# Patient Record
Sex: FEMALE | Race: WHITE | NOT HISPANIC OR LATINO | Employment: UNEMPLOYED | URBAN - METROPOLITAN AREA
[De-identification: names, ages, dates, MRNs, and addresses within clinical notes are randomized per-mention and may not be internally consistent; named-entity substitution may affect disease eponyms.]

---

## 2023-03-02 LAB
FOLLITROPIN (IU/L) IN SER/PLAS: 57.5 IU/L
LUTEINIZING HORMONE (IU/ML) IN SER/PLAS: 49 IU/L

## 2023-03-27 ENCOUNTER — OFFICE VISIT (OUTPATIENT)
Dept: PRIMARY CARE | Facility: CLINIC | Age: 48
End: 2023-03-27
Payer: COMMERCIAL

## 2023-03-27 VITALS
SYSTOLIC BLOOD PRESSURE: 180 MMHG | TEMPERATURE: 97.8 F | OXYGEN SATURATION: 97 % | BODY MASS INDEX: 20.49 KG/M2 | WEIGHT: 123 LBS | HEIGHT: 65 IN | RESPIRATION RATE: 20 BRPM | DIASTOLIC BLOOD PRESSURE: 93 MMHG | HEART RATE: 120 BPM

## 2023-03-27 DIAGNOSIS — D25.9 UTERINE LEIOMYOMA, UNSPECIFIED LOCATION: ICD-10-CM

## 2023-03-27 DIAGNOSIS — R20.2 NUMBNESS AND TINGLING IN BOTH HANDS: ICD-10-CM

## 2023-03-27 DIAGNOSIS — K76.0 HEPATIC STEATOSIS: ICD-10-CM

## 2023-03-27 DIAGNOSIS — R11.15 CYCLICAL VOMITING SYNDROME NOT ASSOCIATED WITH MIGRAINE: ICD-10-CM

## 2023-03-27 DIAGNOSIS — F10.220 ALCOHOL DEPENDENCE WITH UNCOMPLICATED INTOXICATION (MULTI): ICD-10-CM

## 2023-03-27 DIAGNOSIS — E44.0 MODERATE PROTEIN-CALORIE MALNUTRITION (MULTI): ICD-10-CM

## 2023-03-27 DIAGNOSIS — R10.84 GENERALIZED ABDOMINAL PAIN: ICD-10-CM

## 2023-03-27 DIAGNOSIS — F12.20 MARIJUANA DEPENDENCE (MULTI): ICD-10-CM

## 2023-03-27 DIAGNOSIS — R20.0 NUMBNESS AND TINGLING IN BOTH HANDS: ICD-10-CM

## 2023-03-27 DIAGNOSIS — Z72.0 TOBACCO USE: Primary | ICD-10-CM

## 2023-03-27 DIAGNOSIS — R63.4 WEIGHT LOSS, UNINTENTIONAL: ICD-10-CM

## 2023-03-27 DIAGNOSIS — R27.0 ATAXIA: ICD-10-CM

## 2023-03-27 PROBLEM — F10.20 ALCOHOL DEPENDENCE (MULTI): Status: ACTIVE | Noted: 2023-03-27

## 2023-03-27 PROBLEM — R11.10 VOMITING: Status: ACTIVE | Noted: 2023-03-27

## 2023-03-27 PROCEDURE — 99204 OFFICE O/P NEW MOD 45 MIN: CPT | Performed by: INTERNAL MEDICINE

## 2023-03-27 RX ORDER — OMEPRAZOLE 40 MG/1
40 CAPSULE, DELAYED RELEASE ORAL
COMMUNITY
Start: 2023-02-28 | End: 2023-12-13 | Stop reason: ALTCHOICE

## 2023-03-27 RX ORDER — LANOLIN ALCOHOL/MO/W.PET/CERES
1000 CREAM (GRAM) TOPICAL DAILY
Qty: 30 TABLET | Refills: 3 | Status: SHIPPED | OUTPATIENT
Start: 2023-03-27 | End: 2023-08-08

## 2023-03-27 RX ORDER — ONDANSETRON 8 MG/1
8 TABLET, ORALLY DISINTEGRATING ORAL EVERY 8 HOURS PRN
COMMUNITY
Start: 2023-03-01 | End: 2024-03-13 | Stop reason: SDUPTHER

## 2023-03-27 RX ORDER — LANOLIN ALCOHOL/MO/W.PET/CERES
100 CREAM (GRAM) TOPICAL DAILY
Qty: 30 TABLET | Refills: 11 | Status: SHIPPED | OUTPATIENT
Start: 2023-03-27 | End: 2024-04-15

## 2023-03-27 RX ORDER — FOLIC ACID 1 MG/1
1 TABLET ORAL DAILY
Qty: 30 TABLET | Refills: 11 | Status: SHIPPED | OUTPATIENT
Start: 2023-03-27 | End: 2024-04-15

## 2023-03-27 RX ORDER — PYRIDOXINE HCL (VITAMIN B6) 25 MG
25 TABLET ORAL ONCE
Status: DISCONTINUED | OUTPATIENT
Start: 2023-03-27 | End: 2024-06-07

## 2023-03-27 SDOH — ECONOMIC STABILITY: FOOD INSECURITY: WITHIN THE PAST 12 MONTHS, YOU WORRIED THAT YOUR FOOD WOULD RUN OUT BEFORE YOU GOT MONEY TO BUY MORE.: NEVER TRUE

## 2023-03-27 SDOH — ECONOMIC STABILITY: FOOD INSECURITY: WITHIN THE PAST 12 MONTHS, THE FOOD YOU BOUGHT JUST DIDN'T LAST AND YOU DIDN'T HAVE MONEY TO GET MORE.: NEVER TRUE

## 2023-03-27 ASSESSMENT — ENCOUNTER SYMPTOMS
WHEEZING: 0
RHINORRHEA: 0
FREQUENCY: 0
CHEST TIGHTNESS: 0
DIFFICULTY URINATING: 0
SHORTNESS OF BREATH: 0
ABDOMINAL PAIN: 1
WEAKNESS: 1
SORE THROAT: 0
POLYDIPSIA: 0
DIZZINESS: 1
CHILLS: 1
FATIGUE: 1
FLANK PAIN: 0
UNEXPECTED WEIGHT CHANGE: 1
DYSURIA: 0
ABDOMINAL DISTENTION: 0
OCCASIONAL FEELINGS OF UNSTEADINESS: 1
PALPITATIONS: 0
SEIZURES: 0
FEVER: 1
NECK STIFFNESS: 1
LOSS OF SENSATION IN FEET: 1
DEPRESSION: 0
NAUSEA: 1
MYALGIAS: 1
PSYCHIATRIC NEGATIVE: 1
NUMBNESS: 1
HEADACHES: 1
TREMORS: 1
DIARRHEA: 1
LIGHT-HEADEDNESS: 1
VOMITING: 1
BLOOD IN STOOL: 0
CONSTIPATION: 1

## 2023-03-27 ASSESSMENT — PATIENT HEALTH QUESTIONNAIRE - PHQ9
SUM OF ALL RESPONSES TO PHQ9 QUESTIONS 1 & 2: 0
1. LITTLE INTEREST OR PLEASURE IN DOING THINGS: NOT AT ALL
2. FEELING DOWN, DEPRESSED OR HOPELESS: NOT AT ALL

## 2023-03-27 ASSESSMENT — LIFESTYLE VARIABLES
AUDIT-C TOTAL SCORE: 4
HOW MANY STANDARD DRINKS CONTAINING ALCOHOL DO YOU HAVE ON A TYPICAL DAY: 1 OR 2
HOW OFTEN DO YOU HAVE A DRINK CONTAINING ALCOHOL: 4 OR MORE TIMES A WEEK
HOW OFTEN DO YOU HAVE SIX OR MORE DRINKS ON ONE OCCASION: NEVER
SKIP TO QUESTIONS 9-10: 1

## 2023-03-27 NOTE — ASSESSMENT & PLAN NOTE
Pt asleep on stretcher. Daughter at bedside. No distress observed at this time. Will continue to monitor. This is concerning for acute on chronic liver disease, likely due to long standing  alcohol abuse. Check vitamin B levels including B 12, B 6, and thiamine, will start B supplement. Check an US of the liver and spleen now. Check acute hepatitis studies, HIV

## 2023-03-27 NOTE — PROGRESS NOTES
"Subjective   Patient ID: Maru Huynh is a 47 y.o. female who presents for New Patient Visit, body pain (Severe pain X several months.), mobility issues, hand numbness, and Nausea.    HPI   Patient is here to establish primary care. Has not seen a primary care physician for the past 5-6  years. Does not have any diagnosed medical illness. Is not up to date on adult vaccines but was fully vaccinated as a child.   Does not take medications or vitamins.    States 4 weeks ago, woke up with numbness and tingling in hands bilaterally and lower extremity pain. Went to [facility] emergency department and discharged without alleviation of symptoms. Uterine fibroid was noted on ultrasound and patient followed-up with OB/GYN, which was evaluated as benign. Mentions menopausal with last menstrual period two years ago.    Over the last few weeks, pain has increased in intensity, is finding ambulation to be difficult with intermittent episodes of losing balance, sharp \"stabbing\" pain lasting 10 seconds, and episodes of inability to use her hands with tremulous activity. Has had nonbilious/nonbloody emesis intermittently over last week. Has vomited about 5 times today with subsequent 15 pound weight loss in the last 2 weeks. She has had some diarrhea, omeprazole caused some constipation    Family history of cardiac disease and cancer in maternal and paternal side.    Lives at home with significant other. Feels safe at home.  Tobacco 1 ppd for last 20 years. Smokes marijuana daily.  Drinks 1 pint liquor daily. Gets drunk to help sleep at night.  Has had alcohol withdrawal but never hospitalized. Admits to undiagnosed seizures but does not think it was due to alcohol.    Review of Systems   Constitutional:  Positive for chills, fatigue, fever and unexpected weight change (15 pound loss).   HENT:  Negative for hearing loss, nosebleeds, postnasal drip, rhinorrhea, sneezing and sore throat.    Respiratory:  Negative for chest tightness, " "shortness of breath and wheezing.    Cardiovascular:  Positive for chest pain (intermittent with vomiting). Negative for palpitations and leg swelling.   Gastrointestinal:  Positive for abdominal pain, constipation, diarrhea, nausea and vomiting. Negative for abdominal distention and blood in stool.   Endocrine: Negative for heat intolerance, polydipsia and polyuria.   Genitourinary:  Negative for difficulty urinating, dysuria, flank pain, frequency and urgency.   Musculoskeletal:  Positive for gait problem, myalgias and neck stiffness.   Skin: Negative.    Neurological:  Positive for dizziness, tremors, weakness, light-headedness, numbness and headaches. Negative for seizures and syncope.   Psychiatric/Behavioral: Negative.         Objective   BP (!) 180/93 (BP Location: Left arm, Patient Position: Sitting, BP Cuff Size: Adult)   Pulse (!) 120   Temp 36.6 °C (97.8 °F)   Resp 20   Ht 1.638 m (5' 4.5\")   Wt 55.8 kg (123 lb)   SpO2 97%   BMI 20.79 kg/m²     Physical Exam  Constitutional:       Appearance: She is ill-appearing. She is not diaphoretic.      Comments: Looks malnourished.    HENT:      Head: Normocephalic and atraumatic.      Comments: She is edentulous     Nose: Nose normal.   Eyes:      General: Scleral icterus present.      Extraocular Movements: Extraocular movements intact.   Cardiovascular:      Rate and Rhythm: Regular rhythm. Tachycardia present.      Pulses: Normal pulses.      Heart sounds: Normal heart sounds.   Pulmonary:      Effort: Pulmonary effort is normal. No respiratory distress.      Breath sounds: Normal breath sounds. No wheezing.   Chest:      Chest wall: Tenderness (Tender to palpation) present.   Abdominal:      General: Abdomen is flat. Bowel sounds are normal. There is no distension.      Palpations: Abdomen is soft.      Tenderness: There is abdominal tenderness (Right upper and Left upper quadrant). There is guarding (Right upper and Left upper quadrant). There is no " rebound.      Comments: Hepatomegaly is noted , palpable about 3-4 cm below RCM   Musculoskeletal:         General: Swelling and tenderness present.      Cervical back: Normal range of motion and neck supple.      Right lower leg: No edema.      Left lower leg: No edema.   Skin:     General: Skin is dry.      Coloration: Skin is pale.      Findings: No erythema or lesion.      Comments: No scleral icterus is noted, skin is mildly jaundiced   Neurological:      Mental Status: She is alert and oriented to person, place, and time.      Sensory: No sensory deficit.      Motor: Weakness present.      Coordination: Coordination abnormal.      Gait: Gait abnormal (Ataxic and tremulous).      Deep Tendon Reflexes: Reflexes normal.      Comments: Tremor of the hands is noted, finger to nose testing does cause an increase in symptoms of tremor   Psychiatric:         Mood and Affect: Mood normal.         Behavior: Behavior normal.         Thought Content: Thought content normal.         Assessment/Plan   Problem List Items Addressed This Visit          Nervous    Generalized abdominal pain: This is concerning for acute on chronic liver disease, likely due to long standing alcohol abuse. Check vitamin B levels, including B12, B6, thiamine , will start B supplements , check hepatitis studies and HIV also      Numbness and tingling in both hands: see above       Digestive    Hepatic steatosis     Labs from 2/28 emergency department visit reveal , ALT 39.             Genitourinary    Uterine fibroid     Continue to follow-up with OB/GYN.            Endocrine/Metabolic    Weight loss, unintentional    Moderate protein-calorie malnutrition (CMS/HCC), check B levels       Other    Tobacco use - Primary     Smokes 1 ppd. Declining cessation at this time.         Alcohol dependence (CMS/HCC)     Will check B12 levels. Will start on thiamine and folic acid supplementation.         Marijuana dependence (CMS/HCC)    Ataxia   Acute  initial visit: patient may need to go through detox     Will refer to hepatobiliary specialist if needed , check US. If the symptoms worsen, go to ER for admission for treatment of alcohol withdrawal    Continue to follow-up with OB/GYN.  Follow-up here after lab results. Go to ER if symptoms progress. She will require detox

## 2023-04-04 ENCOUNTER — TELEPHONE (OUTPATIENT)
Dept: PRIMARY CARE | Facility: CLINIC | Age: 48
End: 2023-04-04
Payer: COMMERCIAL

## 2023-04-04 NOTE — TELEPHONE ENCOUNTER
Pt was discharged Sunday after going to the hospital during LOV and is supposed to follow up with you within a week but the soonest apt for any provider here is 3/27. What do you want to do?

## 2023-04-12 PROBLEM — F10.939 ALCOHOL WITHDRAWAL SYNDROME (MULTI): Status: ACTIVE | Noted: 2023-04-12

## 2023-04-12 PROBLEM — E87.29 ALCOHOLIC KETOACIDOSIS: Status: ACTIVE | Noted: 2023-04-12

## 2023-04-14 ENCOUNTER — LAB (OUTPATIENT)
Dept: LAB | Facility: LAB | Age: 48
End: 2023-04-14
Payer: COMMERCIAL

## 2023-04-14 ENCOUNTER — OFFICE VISIT (OUTPATIENT)
Dept: PRIMARY CARE | Facility: CLINIC | Age: 48
End: 2023-04-14
Payer: COMMERCIAL

## 2023-04-14 VITALS
WEIGHT: 123 LBS | BODY MASS INDEX: 20.49 KG/M2 | HEART RATE: 109 BPM | SYSTOLIC BLOOD PRESSURE: 120 MMHG | OXYGEN SATURATION: 96 % | DIASTOLIC BLOOD PRESSURE: 79 MMHG | RESPIRATION RATE: 16 BRPM | HEIGHT: 65 IN

## 2023-04-14 DIAGNOSIS — F12.20 MARIJUANA DEPENDENCE (MULTI): ICD-10-CM

## 2023-04-14 DIAGNOSIS — F10.220 ALCOHOL DEPENDENCE WITH UNCOMPLICATED INTOXICATION (MULTI): ICD-10-CM

## 2023-04-14 DIAGNOSIS — R20.0 NUMBNESS AND TINGLING IN BOTH HANDS: ICD-10-CM

## 2023-04-14 DIAGNOSIS — F32.A DEPRESSIVE DISORDER: ICD-10-CM

## 2023-04-14 DIAGNOSIS — E44.0 MODERATE PROTEIN-CALORIE MALNUTRITION (MULTI): ICD-10-CM

## 2023-04-14 DIAGNOSIS — R20.2 NUMBNESS AND TINGLING IN BOTH HANDS: ICD-10-CM

## 2023-04-14 DIAGNOSIS — F10.930 ALCOHOL WITHDRAWAL SYNDROME WITHOUT COMPLICATION (MULTI): ICD-10-CM

## 2023-04-14 DIAGNOSIS — Z72.0 TOBACCO USE: ICD-10-CM

## 2023-04-14 DIAGNOSIS — R11.15 CYCLICAL VOMITING SYNDROME NOT ASSOCIATED WITH MIGRAINE: ICD-10-CM

## 2023-04-14 DIAGNOSIS — D25.9 UTERINE LEIOMYOMA, UNSPECIFIED LOCATION: ICD-10-CM

## 2023-04-14 DIAGNOSIS — R63.4 WEIGHT LOSS, UNINTENTIONAL: ICD-10-CM

## 2023-04-14 DIAGNOSIS — K76.0 HEPATIC STEATOSIS: Primary | ICD-10-CM

## 2023-04-14 DIAGNOSIS — R27.0 ATAXIA: ICD-10-CM

## 2023-04-14 DIAGNOSIS — R10.84 GENERALIZED ABDOMINAL PAIN: ICD-10-CM

## 2023-04-14 DIAGNOSIS — K76.0 HEPATIC STEATOSIS: ICD-10-CM

## 2023-04-14 LAB
ALANINE AMINOTRANSFERASE (SGPT) (U/L) IN SER/PLAS: 30 U/L (ref 7–45)
ALBUMIN (G/DL) IN SER/PLAS: 4.6 G/DL (ref 3.4–5)
ALKALINE PHOSPHATASE (U/L) IN SER/PLAS: 104 U/L (ref 33–110)
ANION GAP IN SER/PLAS: 13 MMOL/L (ref 10–20)
ASPARTATE AMINOTRANSFERASE (SGOT) (U/L) IN SER/PLAS: 44 U/L (ref 9–39)
BASOPHILS (10*3/UL) IN BLOOD BY AUTOMATED COUNT: 0.12 X10E9/L (ref 0–0.1)
BASOPHILS/100 LEUKOCYTES IN BLOOD BY AUTOMATED COUNT: 1.7 % (ref 0–2)
BILIRUBIN DIRECT (MG/DL) IN SER/PLAS: 0.1 MG/DL (ref 0–0.3)
BILIRUBIN TOTAL (MG/DL) IN SER/PLAS: 0.5 MG/DL (ref 0–1.2)
CALCIUM (MG/DL) IN SER/PLAS: 10.1 MG/DL (ref 8.6–10.3)
CARBON DIOXIDE, TOTAL (MMOL/L) IN SER/PLAS: 25 MMOL/L (ref 21–32)
CHLORIDE (MMOL/L) IN SER/PLAS: 103 MMOL/L (ref 98–107)
COBALAMIN (VITAMIN B12) (PG/ML) IN SER/PLAS: 875 PG/ML (ref 211–911)
CREATININE (MG/DL) IN SER/PLAS: 0.59 MG/DL (ref 0.5–1.05)
EOSINOPHILS (10*3/UL) IN BLOOD BY AUTOMATED COUNT: 0.08 X10E9/L (ref 0–0.7)
EOSINOPHILS/100 LEUKOCYTES IN BLOOD BY AUTOMATED COUNT: 1.1 % (ref 0–6)
ERYTHROCYTE DISTRIBUTION WIDTH (RATIO) BY AUTOMATED COUNT: 13.6 % (ref 11.5–14.5)
ERYTHROCYTE MEAN CORPUSCULAR HEMOGLOBIN CONCENTRATION (G/DL) BY AUTOMATED: 32.6 G/DL (ref 32–36)
ERYTHROCYTE MEAN CORPUSCULAR VOLUME (FL) BY AUTOMATED COUNT: 98 FL (ref 80–100)
ERYTHROCYTES (10*6/UL) IN BLOOD BY AUTOMATED COUNT: 4.71 X10E12/L (ref 4–5.2)
FOLATE (NG/ML) IN SER/PLAS: >22.3 NG/ML
GFR FEMALE: >90 ML/MIN/1.73M2
GLUCOSE (MG/DL) IN SER/PLAS: 101 MG/DL (ref 74–99)
HEMATOCRIT (%) IN BLOOD BY AUTOMATED COUNT: 46 % (ref 36–46)
HEMOGLOBIN (G/DL) IN BLOOD: 15 G/DL (ref 12–16)
IMMATURE GRANULOCYTES/100 LEUKOCYTES IN BLOOD BY AUTOMATED COUNT: 0.4 % (ref 0–0.9)
LEUKOCYTES (10*3/UL) IN BLOOD BY AUTOMATED COUNT: 7.1 X10E9/L (ref 4.4–11.3)
LYMPHOCYTES (10*3/UL) IN BLOOD BY AUTOMATED COUNT: 1.69 X10E9/L (ref 1.2–4.8)
LYMPHOCYTES/100 LEUKOCYTES IN BLOOD BY AUTOMATED COUNT: 24 % (ref 13–44)
MAGNESIUM (MG/DL) IN SER/PLAS: 1.91 MG/DL (ref 1.6–2.4)
MONOCYTES (10*3/UL) IN BLOOD BY AUTOMATED COUNT: 0.52 X10E9/L (ref 0.1–1)
MONOCYTES/100 LEUKOCYTES IN BLOOD BY AUTOMATED COUNT: 7.4 % (ref 2–10)
NEUTROPHILS (10*3/UL) IN BLOOD BY AUTOMATED COUNT: 4.61 X10E9/L (ref 1.2–7.7)
NEUTROPHILS/100 LEUKOCYTES IN BLOOD BY AUTOMATED COUNT: 65.4 % (ref 40–80)
PLATELETS (10*3/UL) IN BLOOD AUTOMATED COUNT: 260 X10E9/L (ref 150–450)
POTASSIUM (MMOL/L) IN SER/PLAS: 3.7 MMOL/L (ref 3.5–5.3)
PROTEIN TOTAL: 7.2 G/DL (ref 6.4–8.2)
SODIUM (MMOL/L) IN SER/PLAS: 137 MMOL/L (ref 136–145)
THYROTROPIN (MIU/L) IN SER/PLAS BY DETECTION LIMIT <= 0.05 MIU/L: 2.46 MIU/L (ref 0.44–3.98)
UREA NITROGEN (MG/DL) IN SER/PLAS: 10 MG/DL (ref 6–23)

## 2023-04-14 PROCEDURE — 80076 HEPATIC FUNCTION PANEL: CPT

## 2023-04-14 PROCEDURE — 85025 COMPLETE CBC W/AUTO DIFF WBC: CPT

## 2023-04-14 PROCEDURE — 82607 VITAMIN B-12: CPT

## 2023-04-14 PROCEDURE — 87389 HIV-1 AG W/HIV-1&-2 AB AG IA: CPT

## 2023-04-14 PROCEDURE — 80048 BASIC METABOLIC PNL TOTAL CA: CPT

## 2023-04-14 PROCEDURE — 99214 OFFICE O/P EST MOD 30 MIN: CPT | Performed by: INTERNAL MEDICINE

## 2023-04-14 PROCEDURE — 84443 ASSAY THYROID STIM HORMONE: CPT

## 2023-04-14 PROCEDURE — 80074 ACUTE HEPATITIS PANEL: CPT

## 2023-04-14 PROCEDURE — 83735 ASSAY OF MAGNESIUM: CPT

## 2023-04-14 PROCEDURE — 82746 ASSAY OF FOLIC ACID SERUM: CPT

## 2023-04-14 PROCEDURE — 84425 ASSAY OF VITAMIN B-1: CPT

## 2023-04-14 PROCEDURE — 36415 COLL VENOUS BLD VENIPUNCTURE: CPT

## 2023-04-14 RX ORDER — CLONIDINE HYDROCHLORIDE 0.1 MG/1
TABLET ORAL
COMMUNITY
Start: 2023-04-01 | End: 2023-04-26 | Stop reason: SDUPTHER

## 2023-04-14 RX ORDER — IBUPROFEN 200 MG
1 TABLET ORAL EVERY 24 HOURS
Qty: 30 PATCH | Refills: 1 | Status: SHIPPED | OUTPATIENT
Start: 2023-04-14 | End: 2023-06-27

## 2023-04-14 RX ORDER — DULOXETIN HYDROCHLORIDE 30 MG/1
30 CAPSULE, DELAYED RELEASE ORAL DAILY
Qty: 60 CAPSULE | Refills: 3 | Status: SHIPPED | OUTPATIENT
Start: 2023-04-14 | End: 2023-09-28 | Stop reason: SDUPTHER

## 2023-04-14 RX ORDER — AMLODIPINE BESYLATE 10 MG/1
TABLET ORAL
COMMUNITY
Start: 2023-04-01 | End: 2023-04-26 | Stop reason: SDUPTHER

## 2023-04-14 ASSESSMENT — PATIENT HEALTH QUESTIONNAIRE - PHQ9
6. FEELING BAD ABOUT YOURSELF - OR THAT YOU ARE A FAILURE OR HAVE LET YOURSELF OR YOUR FAMILY DOWN: SEVERAL DAYS
5. POOR APPETITE OR OVEREATING: NOT AT ALL
10. IF YOU CHECKED OFF ANY PROBLEMS, HOW DIFFICULT HAVE THESE PROBLEMS MADE IT FOR YOU TO DO YOUR WORK, TAKE CARE OF THINGS AT HOME, OR GET ALONG WITH OTHER PEOPLE: EXTREMELY DIFFICULT
1. LITTLE INTEREST OR PLEASURE IN DOING THINGS: NEARLY EVERY DAY
3. TROUBLE FALLING OR STAYING ASLEEP OR SLEEPING TOO MUCH: NEARLY EVERY DAY
7. TROUBLE CONCENTRATING ON THINGS, SUCH AS READING THE NEWSPAPER OR WATCHING TELEVISION: NEARLY EVERY DAY
8. MOVING OR SPEAKING SO SLOWLY THAT OTHER PEOPLE COULD HAVE NOTICED. OR THE OPPOSITE, BEING SO FIGETY OR RESTLESS THAT YOU HAVE BEEN MOVING AROUND A LOT MORE THAN USUAL: NOT AT ALL
SUM OF ALL RESPONSES TO PHQ9 QUESTIONS 1 AND 2: 6
2. FEELING DOWN, DEPRESSED OR HOPELESS: NEARLY EVERY DAY
SUM OF ALL RESPONSES TO PHQ QUESTIONS 1-9: 18
4. FEELING TIRED OR HAVING LITTLE ENERGY: NEARLY EVERY DAY
9. THOUGHTS THAT YOU WOULD BE BETTER OFF DEAD, OR OF HURTING YOURSELF: MORE THAN HALF THE DAYS

## 2023-04-14 NOTE — ASSESSMENT & PLAN NOTE
B12, folate levels are in normal range, patient is taking both vitamins now, she has quit alcohol use which is likely the best therapy. Will check NCT/ EMG of the upper extremities. Check a TSH, recheck liver enzymes now. Trial of duloxetine 30 mg daily for 1 week then increase to 60 mg daily, recheck the patient in about 4 weeks

## 2023-04-15 LAB
HEPATITIS A VIRUS IGM AB PRESENCE IN SER/PLAS BY IMMUNOASSAY: NONREACTIVE
HEPATITIS B VIRUS CORE IGM AB PRESENCE IN SER/PLAS BY IMMUNOASSY: NONREACTIVE
HEPATITIS B VIRUS SURFACE AG PRESENCE IN SERUM: NONREACTIVE
HEPATITIS C VIRUS AB PRESENCE IN SERUM: NONREACTIVE
HIV 1/ 2 AG/AB SCREEN: NONREACTIVE

## 2023-04-20 LAB — VITAMIN B1, WHOLE BLOOD: 213 NMOL/L (ref 70–180)

## 2023-04-26 DIAGNOSIS — I10 PRIMARY HYPERTENSION: Primary | ICD-10-CM

## 2023-04-26 RX ORDER — CLONIDINE HYDROCHLORIDE 0.1 MG/1
0.1 TABLET ORAL DAILY
Qty: 90 TABLET | Refills: 2 | Status: SHIPPED | OUTPATIENT
Start: 2023-04-26 | End: 2023-05-09 | Stop reason: SDUPTHER

## 2023-04-26 RX ORDER — AMLODIPINE BESYLATE 10 MG/1
TABLET ORAL
Qty: 90 TABLET | Refills: 2 | Status: SHIPPED | OUTPATIENT
Start: 2023-04-26 | End: 2023-11-22

## 2023-04-26 NOTE — TELEPHONE ENCOUNTER
Pt called to request refills on the 2 medications that she was prescribed at the hospital after surgery...amlodipine 10 mg 1 daily and clonidine 0.1 mg 2 x daily sent to [facility] in [place].    Also had questions about the Cymbalta, she is having very bad body aches and very tired. She wanted to know how long that will last, if its a side effect, and if it will get better.

## 2023-05-02 ENCOUNTER — TELEPHONE (OUTPATIENT)
Dept: PRIMARY CARE | Facility: CLINIC | Age: 48
End: 2023-05-02
Payer: COMMERCIAL

## 2023-05-02 DIAGNOSIS — I10 PRIMARY HYPERTENSION: ICD-10-CM

## 2023-05-02 NOTE — TELEPHONE ENCOUNTER
Patient called re: her clonidine rx    She was prescribed this when she left the hospital.  She was taking 2 a day.  The refill that Dr NICHOLS called in directs her to take one a day.  She wants to confirm the dosage before her next appointment.

## 2023-05-09 RX ORDER — CLONIDINE HYDROCHLORIDE 0.1 MG/1
0.1 TABLET ORAL 2 TIMES DAILY
Qty: 180 TABLET | Refills: 2 | Status: SHIPPED | OUTPATIENT
Start: 2023-05-09 | End: 2024-02-19

## 2023-05-12 ENCOUNTER — OFFICE VISIT (OUTPATIENT)
Dept: PRIMARY CARE | Facility: CLINIC | Age: 48
End: 2023-05-12
Payer: COMMERCIAL

## 2023-05-12 VITALS
HEIGHT: 65 IN | SYSTOLIC BLOOD PRESSURE: 112 MMHG | BODY MASS INDEX: 18.83 KG/M2 | TEMPERATURE: 97 F | OXYGEN SATURATION: 97 % | WEIGHT: 113 LBS | RESPIRATION RATE: 18 BRPM | HEART RATE: 92 BPM | DIASTOLIC BLOOD PRESSURE: 77 MMHG

## 2023-05-12 DIAGNOSIS — F10.220 ALCOHOL DEPENDENCE WITH UNCOMPLICATED INTOXICATION (MULTI): Primary | ICD-10-CM

## 2023-05-12 DIAGNOSIS — M79.2 PERIPHERAL NEUROPATHIC PAIN: ICD-10-CM

## 2023-05-12 DIAGNOSIS — Z72.0 TOBACCO USE: ICD-10-CM

## 2023-05-12 DIAGNOSIS — R20.0 NUMBNESS AND TINGLING IN BOTH HANDS: ICD-10-CM

## 2023-05-12 DIAGNOSIS — R20.2 NUMBNESS AND TINGLING IN BOTH HANDS: ICD-10-CM

## 2023-05-12 PROBLEM — T78.40XA ALLERGY: Status: ACTIVE | Noted: 2017-11-22

## 2023-05-12 PROCEDURE — 99214 OFFICE O/P EST MOD 30 MIN: CPT | Performed by: INTERNAL MEDICINE

## 2023-05-12 RX ORDER — GABAPENTIN 100 MG/1
100 CAPSULE ORAL 3 TIMES DAILY
Qty: 90 CAPSULE | Refills: 3 | Status: SHIPPED | OUTPATIENT
Start: 2023-05-12 | End: 2023-07-14 | Stop reason: SDUPTHER

## 2023-05-12 ASSESSMENT — ENCOUNTER SYMPTOMS
OCCASIONAL FEELINGS OF UNSTEADINESS: 1
DEPRESSION: 0
LOSS OF SENSATION IN FEET: 1

## 2023-05-12 NOTE — PROGRESS NOTES
Chief Complaint/HPI:    Patient was hospitalized after beginning to experience  symptoms of acute alcohol withdrawal, she was hospitalized for treatment, she was discharged to follow up at [place] [address] for intensive outpatient therapy, the patient still has pain and numbness in the hands worse than the feet. The neuropathy seems to be diffuse now. Patient has been getting stabbing pain in the hands and feet. She is taking Cymbalta. NCTs/ EMGs have not been able to be scheduled. Patient is upset that these could not be scheduled as ordered. Patient is eating, she has lost quite a bit of weight.   Patient is still smoking, she tried a nicotine patch, it has not been effective.  Patient smokes about 1 1/2 ppd. She is not able to walk due to pain issues. She is now also taking B12, folate, and thiamine. She has not had any alcohol for 7 weeks.     ROS otherwise negative aside from what was mentioned above in HPI.      Patient Active Problem List   Diagnosis    Tobacco use    Alcohol dependence (CMS/HCC)    Marijuana dependence (CMS/HCC)    Generalized abdominal pain    Numbness and tingling in both hands    Ataxia    Uterine fibroid    Weight loss, unintentional    Hepatic steatosis    Moderate protein-calorie malnutrition (CMS/HCC)    Vomiting    Alcohol withdrawal syndrome (CMS/HCC)    Alcoholic ketoacidosis    Allergy         No past medical history on file.  Past Surgical History:   Procedure Laterality Date    TONSILLECTOMY Bilateral     TUBAL LIGATION       Social History     Social History Narrative    Not on file         ALLERGIES  Penicillins      MEDICATIONS  Current Outpatient Medications on File Prior to Visit   Medication Sig Dispense Refill    amLODIPine (Norvasc) 10 mg tablet Take one daily 90 tablet 2    cloNIDine (Catapres) 0.1 mg tablet Take 1 tablet (0.1 mg) by mouth 2 times a day. 180 tablet 2    cyanocobalamin (Vitamin B-12) 1,000 mcg tablet Take 1 tablet (1,000 mcg) by mouth once daily. 30 tablet 3  "   DULoxetine (Cymbalta) 30 mg DR capsule Take 1 capsule (30 mg) by mouth once daily. Do not crush or chew. Take 1 capsule daily for 7 days, then increase to 2 capsules daily 60 capsule 3    folic acid (Folvite) 1 mg tablet Take 1 tablet (1 mg) by mouth once daily. 30 tablet 11    ondansetron ODT (Zofran-ODT) 8 mg disintegrating tablet Take 1 tablet (8 mg) by mouth every 8 hours if needed for vomiting.      thiamine (Vitamin B-1) 100 mg tablet Take 1 tablet (100 mg) by mouth once daily. 30 tablet 11    nicotine (Nicoderm CQ) 21 mg/24 hr patch Place 1 patch over 24 hours on the skin once every 24 hours. (Patient not taking: Reported on 5/12/2023) 30 patch 1    omeprazole (PriLOSEC) 40 mg DR capsule Take 1 capsule (40 mg) by mouth once daily.      [DISCONTINUED] cloNIDine (Catapres) 0.1 mg tablet Take 1 tablet (0.1 mg) by mouth once daily. 90 tablet 2     Current Facility-Administered Medications on File Prior to Visit   Medication Dose Route Frequency Provider Last Rate Last Admin    pyridoxine (Vitamin B-6) tablet 25 mg  25 mg oral Once Javier Roberts MD             PHYSICAL EXAM  /77 (BP Location: Left arm, Patient Position: Sitting, BP Cuff Size: Adult)   Pulse 92   Temp 36.1 °C (97 °F)   Resp 18   Ht 1.638 m (5' 4.5\")   Wt 51.3 kg (113 lb)   SpO2 97%   BMI 19.10 kg/m²   Body mass index is 19.1 kg/m².  Gen: Alert, NAD, patient is thin, she is alert and oriented x 3 today, she continues to move about in the room due to ongoing pain issues  HEENT:  PERRLA, EOMI, conjunctiva and sclera normal in appearance, very slight ptosis of the right eyelid  Respiratory:  Lungs CTAB, slightly diminished breath sounds throughout  Cardiovascular:  Heart: rapid rate, regular rhythm. No M/R/G  Abdomen: no tenderness appreciated, no obvious organomegaly is noted, BS + x 4.   Neuro:  numbness and tenderness of the hands is noted, patient has difficulty flexing her fingers   Skin:  No suspicious lesions present on " exposed skin    ASSESSMENT/PLAN  Problem List Items Addressed This Visit          Nervous    Numbness and tingling in both hands    Current Assessment & Plan     Continue the duloxetine, trial of gabapentin 100 mg TID for nerve , get NCTs of LE s and UE s completed, the NCTs of the hands have already been ordered         Relevant Medications    gabapentin (Neurontin) 100 mg capsule    Other Relevant Orders    EMG & nerve conduction       Other    Tobacco use    Current Assessment & Plan     Continue the Nicotine patches for now, she will be around non smokers for a few weeks         Alcohol dependence (CMS/HCC) - Primary    Current Assessment & Plan     Continue to encourage the patient, follow up in 4-6 weeks         Relevant Medications    gabapentin (Neurontin) 100 mg capsule     Other Visit Diagnoses       Peripheral neuropathic pain        Relevant Medications    gabapentin (Neurontin) 100 mg capsule    Other Relevant Orders    EMG & nerve conduction          Follow up in 6 weeks  Javier Roberts MD

## 2023-05-12 NOTE — ASSESSMENT & PLAN NOTE
Continue the duloxetine, trial of gabapentin 100 mg TID for nerve , get NCTs of LE s and UE s completed, the NCTs of the hands have already been ordered

## 2023-05-25 ENCOUNTER — TELEPHONE (OUTPATIENT)
Dept: PRIMARY CARE | Facility: CLINIC | Age: 48
End: 2023-05-25
Payer: COMMERCIAL

## 2023-05-25 NOTE — TELEPHONE ENCOUNTER
Pt misplaced all of her medication after traveling and is needing a new rx for medications amlodipine, clonidine, thiamine, duloxetine, folic acid, gabapentin, and Vitamin b12. Pt will be out after today. Pt is wanting this called in ASAP? [facility].

## 2023-05-25 NOTE — TELEPHONE ENCOUNTER
Spoke with patient , informed her refills were given on requested medications. Informed her she may have to pay out of pocket due to refilling too early.

## 2023-06-08 DIAGNOSIS — R20.2 NUMBNESS AND TINGLING IN BOTH HANDS: ICD-10-CM

## 2023-06-08 DIAGNOSIS — R20.0 NUMBNESS AND TINGLING IN BOTH HANDS: ICD-10-CM

## 2023-06-08 DIAGNOSIS — M79.2 PERIPHERAL NEUROPATHIC PAIN: ICD-10-CM

## 2023-06-23 ENCOUNTER — APPOINTMENT (OUTPATIENT)
Dept: PRIMARY CARE | Facility: CLINIC | Age: 48
End: 2023-06-23
Payer: COMMERCIAL

## 2023-06-27 ENCOUNTER — OFFICE VISIT (OUTPATIENT)
Dept: PRIMARY CARE | Facility: CLINIC | Age: 48
End: 2023-06-27
Payer: COMMERCIAL

## 2023-06-27 ENCOUNTER — LAB (OUTPATIENT)
Dept: LAB | Facility: LAB | Age: 48
End: 2023-06-27
Payer: COMMERCIAL

## 2023-06-27 VITALS
WEIGHT: 106 LBS | DIASTOLIC BLOOD PRESSURE: 71 MMHG | HEART RATE: 87 BPM | TEMPERATURE: 97.5 F | SYSTOLIC BLOOD PRESSURE: 100 MMHG | BODY MASS INDEX: 17.66 KG/M2 | HEIGHT: 65 IN

## 2023-06-27 DIAGNOSIS — G62.1 ALCOHOL-INDUCED POLYNEUROPATHY (MULTI): Primary | ICD-10-CM

## 2023-06-27 DIAGNOSIS — R20.2 NUMBNESS AND TINGLING IN BOTH HANDS: ICD-10-CM

## 2023-06-27 DIAGNOSIS — R10.84 GENERALIZED ABDOMINAL PAIN: ICD-10-CM

## 2023-06-27 DIAGNOSIS — Z72.0 TOBACCO USE: ICD-10-CM

## 2023-06-27 DIAGNOSIS — F10.229 ALCOHOL DEPENDENCE WITH INTOXICATION WITH COMPLICATION (MULTI): ICD-10-CM

## 2023-06-27 DIAGNOSIS — R11.2 NAUSEA AND VOMITING, UNSPECIFIED VOMITING TYPE: ICD-10-CM

## 2023-06-27 DIAGNOSIS — G62.1 ALCOHOL-INDUCED POLYNEUROPATHY (MULTI): ICD-10-CM

## 2023-06-27 DIAGNOSIS — R63.4 WEIGHT LOSS, UNINTENTIONAL: ICD-10-CM

## 2023-06-27 DIAGNOSIS — R20.0 NUMBNESS AND TINGLING IN BOTH HANDS: ICD-10-CM

## 2023-06-27 DIAGNOSIS — R52 PAIN: ICD-10-CM

## 2023-06-27 LAB
ALANINE AMINOTRANSFERASE (SGPT) (U/L) IN SER/PLAS: 25 U/L (ref 7–45)
ALBUMIN (G/DL) IN SER/PLAS: 4.1 G/DL (ref 3.4–5)
ALKALINE PHOSPHATASE (U/L) IN SER/PLAS: 139 U/L (ref 33–110)
ASPARTATE AMINOTRANSFERASE (SGOT) (U/L) IN SER/PLAS: 58 U/L (ref 9–39)
BILIRUBIN DIRECT (MG/DL) IN SER/PLAS: 0.1 MG/DL (ref 0–0.3)
BILIRUBIN TOTAL (MG/DL) IN SER/PLAS: 0.5 MG/DL (ref 0–1.2)
C REACTIVE PROTEIN (MG/L) IN SER/PLAS: <0.1 MG/DL
PROTEIN TOTAL: 7 G/DL (ref 6.4–8.2)
PROTEIN TOTAL: 7 G/DL (ref 6.4–8.2)
SEDIMENTATION RATE, ERYTHROCYTE: 5 MM/H (ref 0–20)

## 2023-06-27 PROCEDURE — 86235 NUCLEAR ANTIGEN ANTIBODY: CPT

## 2023-06-27 PROCEDURE — 85652 RBC SED RATE AUTOMATED: CPT

## 2023-06-27 PROCEDURE — 86225 DNA ANTIBODY NATIVE: CPT

## 2023-06-27 PROCEDURE — 86140 C-REACTIVE PROTEIN: CPT

## 2023-06-27 PROCEDURE — 86334 IMMUNOFIX E-PHORESIS SERUM: CPT

## 2023-06-27 PROCEDURE — 86320 SERUM IMMUNOELECTROPHORESIS: CPT | Performed by: STUDENT IN AN ORGANIZED HEALTH CARE EDUCATION/TRAINING PROGRAM

## 2023-06-27 PROCEDURE — 86038 ANTINUCLEAR ANTIBODIES: CPT

## 2023-06-27 PROCEDURE — 99214 OFFICE O/P EST MOD 30 MIN: CPT | Performed by: INTERNAL MEDICINE

## 2023-06-27 PROCEDURE — 4004F PT TOBACCO SCREEN RCVD TLK: CPT | Performed by: INTERNAL MEDICINE

## 2023-06-27 PROCEDURE — 84165 PROTEIN E-PHORESIS SERUM: CPT

## 2023-06-27 PROCEDURE — 86039 ANTINUCLEAR ANTIBODIES (ANA): CPT

## 2023-06-27 PROCEDURE — 84165 PROTEIN E-PHORESIS SERUM: CPT | Performed by: STUDENT IN AN ORGANIZED HEALTH CARE EDUCATION/TRAINING PROGRAM

## 2023-06-27 PROCEDURE — 80076 HEPATIC FUNCTION PANEL: CPT

## 2023-06-27 PROCEDURE — 36415 COLL VENOUS BLD VENIPUNCTURE: CPT

## 2023-06-27 SDOH — ECONOMIC STABILITY: FOOD INSECURITY: WITHIN THE PAST 12 MONTHS, THE FOOD YOU BOUGHT JUST DIDN'T LAST AND YOU DIDN'T HAVE MONEY TO GET MORE.: NEVER TRUE

## 2023-06-27 SDOH — ECONOMIC STABILITY: FOOD INSECURITY: WITHIN THE PAST 12 MONTHS, YOU WORRIED THAT YOUR FOOD WOULD RUN OUT BEFORE YOU GOT MONEY TO BUY MORE.: NEVER TRUE

## 2023-06-27 ASSESSMENT — PATIENT HEALTH QUESTIONNAIRE - PHQ9
10. IF YOU CHECKED OFF ANY PROBLEMS, HOW DIFFICULT HAVE THESE PROBLEMS MADE IT FOR YOU TO DO YOUR WORK, TAKE CARE OF THINGS AT HOME, OR GET ALONG WITH OTHER PEOPLE: EXTREMELY DIFFICULT
7. TROUBLE CONCENTRATING ON THINGS, SUCH AS READING THE NEWSPAPER OR WATCHING TELEVISION: NEARLY EVERY DAY
2. FEELING DOWN, DEPRESSED OR HOPELESS: NEARLY EVERY DAY
SUM OF ALL RESPONSES TO PHQ QUESTIONS 1-9: 27
9. THOUGHTS THAT YOU WOULD BE BETTER OFF DEAD, OR OF HURTING YOURSELF: NEARLY EVERY DAY
8. MOVING OR SPEAKING SO SLOWLY THAT OTHER PEOPLE COULD HAVE NOTICED. OR THE OPPOSITE, BEING SO FIGETY OR RESTLESS THAT YOU HAVE BEEN MOVING AROUND A LOT MORE THAN USUAL: NEARLY EVERY DAY
3. TROUBLE FALLING OR STAYING ASLEEP: NEARLY EVERY DAY
6. FEELING BAD ABOUT YOURSELF - OR THAT YOU ARE A FAILURE OR HAVE LET YOURSELF OR YOUR FAMILY DOWN: NEARLY EVERY DAY
5. POOR APPETITE OR OVEREATING: NEARLY EVERY DAY
SUM OF ALL RESPONSES TO PHQ9 QUESTIONS 1 & 2: 6
4. FEELING TIRED OR HAVING LITTLE ENERGY: NEARLY EVERY DAY
1. LITTLE INTEREST OR PLEASURE IN DOING THINGS: NEARLY EVERY DAY

## 2023-06-27 ASSESSMENT — LIFESTYLE VARIABLES
HOW OFTEN DO YOU HAVE A DRINK CONTAINING ALCOHOL: NEVER
HOW OFTEN DO YOU HAVE SIX OR MORE DRINKS ON ONE OCCASION: NEVER
SKIP TO QUESTIONS 9-10: 1
HOW MANY STANDARD DRINKS CONTAINING ALCOHOL DO YOU HAVE ON A TYPICAL DAY: 1 OR 2
AUDIT-C TOTAL SCORE: 0

## 2023-06-27 ASSESSMENT — PAIN SCALES - GENERAL: PAINLEVEL: 8

## 2023-06-27 NOTE — ASSESSMENT & PLAN NOTE
Likely due to issues of alcohol use, refer to GI for assessment, best option is complete alcohol cessation, go to counseling if possible

## 2023-06-27 NOTE — ASSESSMENT & PLAN NOTE
Patient has diffuse neuropathic issues. Patient will be referred to neurology for assessment, this is still likely due to alcohol issues. Check sed rate, CRP, SPEP to evaluate for inflammatory issues, check CXR since patient smokes

## 2023-06-27 NOTE — PROGRESS NOTES
Chief Complaint/HPI:    Patient admits to drinking on Memorial day, she has had wine coolers.    Patient still has neuropathy symptoms, the gabapentin is helpful. She does get intermittent nausea and vomiting. . She is taking Cymbalta. She did have vomiting this morning. Patient is eating, she has lost quite a bit of weight.   Patient is still smoking, she tried a nicotine patch, it has not been effective.  Patient smokes about 1 1/2 ppd. She is not able to walk due to pain issues. She is now also taking B12, folate, and thiamine. She admits to drinking some alcohol. Patient had EMGs completed.    ROS otherwise negative aside from what was mentioned above in HPI.      Patient Active Problem List   Diagnosis    Tobacco use    Alcohol dependence (CMS/HCC)    Marijuana dependence (CMS/HCC)    Generalized abdominal pain    Numbness and tingling in both hands    Ataxia    Uterine fibroid    Weight loss, unintentional    Hepatic steatosis    Moderate protein-calorie malnutrition (CMS/HCC)    Vomiting    Alcohol withdrawal syndrome (CMS/HCC)    Alcoholic ketoacidosis    Allergy    Alcohol-induced polyneuropathy (CMS/HCC)         No past medical history on file.  Past Surgical History:   Procedure Laterality Date    TONSILLECTOMY Bilateral     TUBAL LIGATION       Social History     Social History Narrative    Not on file         ALLERGIES  Penicillins      MEDICATIONS  Current Outpatient Medications on File Prior to Visit   Medication Sig Dispense Refill    amLODIPine (Norvasc) 10 mg tablet Take one daily 90 tablet 2    cloNIDine (Catapres) 0.1 mg tablet Take 1 tablet (0.1 mg) by mouth 2 times a day. 180 tablet 2    cyanocobalamin (Vitamin B-12) 1,000 mcg tablet Take 1 tablet (1,000 mcg) by mouth once daily. 30 tablet 3    DULoxetine (Cymbalta) 30 mg DR capsule Take 1 capsule (30 mg) by mouth once daily. Do not crush or chew. Take 1 capsule daily for 7 days, then increase to 2 capsules daily 60 capsule 3    folic acid  "(Folvite) 1 mg tablet Take 1 tablet (1 mg) by mouth once daily. 30 tablet 11    gabapentin (Neurontin) 100 mg capsule Take 1 capsule (100 mg) by mouth 3 times a day. 90 capsule 3    omeprazole (PriLOSEC) 40 mg DR capsule Take 1 capsule (40 mg) by mouth once daily.      ondansetron ODT (Zofran-ODT) 8 mg disintegrating tablet Take 1 tablet (8 mg) by mouth every 8 hours if needed for vomiting.      thiamine (Vitamin B-1) 100 mg tablet Take 1 tablet (100 mg) by mouth once daily. 30 tablet 11    [DISCONTINUED] nicotine (Nicoderm CQ) 21 mg/24 hr patch Place 1 patch over 24 hours on the skin once every 24 hours. (Patient not taking: Reported on 5/12/2023) 30 patch 1     Current Facility-Administered Medications on File Prior to Visit   Medication Dose Route Frequency Provider Last Rate Last Admin    pyridoxine (Vitamin B-6) tablet 25 mg  25 mg oral Once Javier Roberts MD             PHYSICAL EXAM  /71 (BP Location: Left arm, Patient Position: Sitting, BP Cuff Size: Adult)   Pulse 87   Temp 36.4 °C (97.5 °F) (Temporal)   Ht 1.651 m (5' 5\")   Wt 48.1 kg (106 lb)   BMI 17.64 kg/m²   Body mass index is 17.64 kg/m².  Gen: Alert, NAD, patient is thin, she is alert and oriented x 3 today, she had episode of vomiting prior to visit today  HEENT:   EOMI, conjunctiva and sclera normal in appearance, very slight ptosis of the right eyelid  Respiratory:  Lungs CTAB, slightly diminished breath sounds throughout  Cardiovascular:  Heart: rapid rate, regular rhythm. No M/R/G  Neuro:  numbness and tenderness of the hands is noted, patient has difficulty flexing her fingers , clubbing of fingers is noted  Skin:  No suspicious lesions present on exposed skin  ASSESSMENT/PLAN  Problem List Items Addressed This Visit       Tobacco use    Current Assessment & Plan     Admits that she continues to smoke         Relevant Orders    Serum Protein Electrophoresis    XR chest 2 views    Alcohol dependence (CMS/HCC)    Current " "Assessment & Plan     Admits to alcohol use, she admits not going to meetings. \"I don't have a way anywhere\". Patient needs to go to her counseling meetings. This is imperative for the patient.         Generalized abdominal pain    Relevant Orders    XR chest 2 views    Referral to Gastroenterology    Numbness and tingling in both hands    Relevant Orders    Sedimentation Rate    C-reactive protein    QUANG with Reflex to KELLY    Serum Protein Electrophoresis    Hepatic function panel    Referral to Neurology    Weight loss, unintentional    Relevant Orders    XR chest 2 views    Referral to Neurology    Vomiting    Current Assessment & Plan     Likely due to issues of alcohol use, refer to GI for assessment, best option is complete alcohol cessation, go to counseling if possible         Relevant Orders    Referral to Gastroenterology    Alcohol-induced polyneuropathy (CMS/HCC) - Primary    Current Assessment & Plan     Patient has diffuse neuropathic issues. Patient will be referred to neurology for assessment, this is still likely due to alcohol issues. Check sed rate, CRP, SPEP to evaluate for inflammatory issues, check CXR since patient smokes         Relevant Orders    Sedimentation Rate    C-reactive protein    QUANG with Reflex to KELLY    Serum Protein Electrophoresis    Hepatic function panel    Referral to Neurology     Encourage the patient to go to counseling  follow up with neurology, GI and nutrition services  Follow up in 3 months      Javier Roberts MD   "

## 2023-06-27 NOTE — ASSESSMENT & PLAN NOTE
"Admits to alcohol use, she admits not going to meetings. \"I don't have a way anywhere\". Patient needs to go to her counseling meetings. This is imperative for the patient.  "

## 2023-06-28 LAB
ANA PATTERN: ABNORMAL
ANA TITER: ABNORMAL
ANTI-CENTROMERE: <0.2 AI
ANTI-CHROMATIN: <0.2 AI
ANTI-DNA (DS): 2 IU/ML
ANTI-JO-1 IGG: <0.2 AI
ANTI-NUCLEAR ANTIBODY (ANA): POSITIVE
ANTI-RIBOSOMAL P: <0.2 AI
ANTI-RNP: 0.4 AI
ANTI-SCL-70: <0.2 AI
ANTI-SM/RNP: <0.2 AI
ANTI-SM: <0.2 AI
ANTI-SSA: <0.2 AI
ANTI-SSB: <0.2 AI

## 2023-07-01 LAB
ALBUMIN ELP: 3.8 G/DL (ref 3.4–5)
ALPHA 1: 0.4 G/DL (ref 0.2–0.6)
ALPHA 2: 0.7 G/DL (ref 0.4–1.1)
BETA: 0.9 G/DL (ref 0.5–1.2)
GAMMA GLOBULIN: 1.1 G/DL (ref 0.5–1.4)
PATH REVIEW - SERUM IMMUNOFIXATION: NORMAL
PATH REVIEW-SERUM PROTEIN ELECTROPHORESIS: NORMAL
PROTEIN ELECTROPHORESIS INTERPRETATION: NORMAL
PROTEIN TOTAL: 7 G/DL (ref 6.4–8.2)
SERUM IMMUNOFIXATION INTERPRETATION: NORMAL

## 2023-07-13 ENCOUNTER — TELEPHONE (OUTPATIENT)
Dept: PRIMARY CARE | Facility: CLINIC | Age: 48
End: 2023-07-13
Payer: COMMERCIAL

## 2023-07-13 DIAGNOSIS — R20.2 NUMBNESS AND TINGLING IN BOTH HANDS: ICD-10-CM

## 2023-07-13 DIAGNOSIS — R20.0 NUMBNESS AND TINGLING IN BOTH HANDS: ICD-10-CM

## 2023-07-13 DIAGNOSIS — F10.220 ALCOHOL DEPENDENCE WITH UNCOMPLICATED INTOXICATION (MULTI): ICD-10-CM

## 2023-07-13 DIAGNOSIS — M79.2 PERIPHERAL NEUROPATHIC PAIN: ICD-10-CM

## 2023-07-13 NOTE — TELEPHONE ENCOUNTER
Pt would like an increased dosage of Gabapentin. She's on 100mg. Stated it really helped her in the beginning but this past week she says it feels like she hasn't even taken it. She is in a lot of pain. She's due for a refill but wants to know if she should increase.    Also states her depression and anxiety has been horrible. Panic attacks have also been really bad. Her duloxetine is 30mg at two a day. She is wondering if this should also be increased. If not, she will still need a refill on the duloxetine.    Should she be seen sooner than her sept appt? She says there is no way she can make it until then.    Pharmacy is [facility] in [place]

## 2023-07-14 RX ORDER — GABAPENTIN 100 MG/1
200 CAPSULE ORAL 3 TIMES DAILY
Qty: 180 CAPSULE | Refills: 3 | Status: SHIPPED | OUTPATIENT
Start: 2023-07-14 | End: 2023-11-06

## 2023-07-18 ENCOUNTER — TELEPHONE (OUTPATIENT)
Dept: PRIMARY CARE | Facility: CLINIC | Age: 48
End: 2023-07-18
Payer: COMMERCIAL

## 2023-07-18 NOTE — TELEPHONE ENCOUNTER
Date/Time Type Contact Phone/Fax           07/18/2023 12:05 PM EDT by Paulina Bernstein MA  Outgoing Maru Huynh (Self) [phone] (Mobile) Remove   No Answer/Busy

## 2023-08-07 DIAGNOSIS — K76.0 HEPATIC STEATOSIS: ICD-10-CM

## 2023-08-07 DIAGNOSIS — R27.0 ATAXIA: ICD-10-CM

## 2023-08-07 DIAGNOSIS — F10.220 ALCOHOL DEPENDENCE WITH UNCOMPLICATED INTOXICATION (MULTI): ICD-10-CM

## 2023-08-08 RX ORDER — LANOLIN ALCOHOL/MO/W.PET/CERES
1000 CREAM (GRAM) TOPICAL DAILY
Qty: 30 TABLET | Refills: 3 | Status: SHIPPED | OUTPATIENT
Start: 2023-08-08 | End: 2023-12-21

## 2023-09-06 PROBLEM — M06.9 ARTHRITIS, RHEUMATOID (MULTI): Status: ACTIVE | Noted: 2023-09-06

## 2023-09-06 PROBLEM — F32.A DEPRESSION: Status: ACTIVE | Noted: 2023-09-06

## 2023-09-06 PROBLEM — N91.2 AMENORRHEA: Status: ACTIVE | Noted: 2023-09-06

## 2023-09-06 PROBLEM — G54.9 SENSORY GANGLIONOPATHY: Status: ACTIVE | Noted: 2023-09-06

## 2023-09-06 PROBLEM — Z04.9 CONDITION NOT FOUND: Status: ACTIVE | Noted: 2023-09-06

## 2023-09-06 PROBLEM — R56.9 SEIZURE (MULTI): Status: ACTIVE | Noted: 2023-09-06

## 2023-09-06 PROBLEM — R20.2 PARESTHESIAS: Status: ACTIVE | Noted: 2023-09-06

## 2023-09-07 LAB
ANTI-SSA: <0.2 AI
ANTI-SSB: <0.2 AI
CREATINE KINASE (U/L) IN SER/PLAS: 43 U/L (ref 0–215)
RHEUMATOID FACTOR (IU/ML) IN SERUM OR PLASMA: 10 IU/ML (ref 0–15)

## 2023-09-11 LAB — VITAMIN B6: 55.2 NMOL/L (ref 20–125)

## 2023-09-18 LAB
AGNA-1, S(MAYO): NEGATIVE
AMPHIPHYSIN AB, S(MAYO): NEGATIVE
ANNA-1, S(MAYO): NEGATIVE
ANNA-2, S(MAYO): NEGATIVE
ANNA-3, S(MAYO): NEGATIVE
CASPR2-IGG CBA, S(MAYO): NEGATIVE
CRMP-5-IGG, S(MAYO): NEGATIVE
IFA NOTES (PNOPL): ABNORMAL
INTERPRETIVE COMMENTS: ABNORMAL
LGI1-IGG CBA, S(MAYO): NEGATIVE
NEURONAL (V-G) K+ CHANNEL AB, S: 0.3 NMOL/L
P/Q-TYPE CALCIUM CHANNEL AB: 0 NMOL/L
PCA-1, S(MAYO): NEGATIVE
PCA-2, S(MAYO): NEGATIVE
PCA-TR, S(MAYO): NEGATIVE

## 2023-09-28 ENCOUNTER — LAB (OUTPATIENT)
Dept: LAB | Facility: LAB | Age: 48
End: 2023-09-28
Payer: COMMERCIAL

## 2023-09-28 ENCOUNTER — OFFICE VISIT (OUTPATIENT)
Dept: PRIMARY CARE | Facility: CLINIC | Age: 48
End: 2023-09-28
Payer: COMMERCIAL

## 2023-09-28 VITALS
HEIGHT: 65 IN | SYSTOLIC BLOOD PRESSURE: 111 MMHG | WEIGHT: 111 LBS | HEART RATE: 99 BPM | BODY MASS INDEX: 18.49 KG/M2 | DIASTOLIC BLOOD PRESSURE: 78 MMHG | OXYGEN SATURATION: 95 %

## 2023-09-28 DIAGNOSIS — Z72.0 TOBACCO USE: ICD-10-CM

## 2023-09-28 DIAGNOSIS — G54.9 SENSORY GANGLIONOPATHY: ICD-10-CM

## 2023-09-28 DIAGNOSIS — F10.229 ALCOHOL DEPENDENCE WITH INTOXICATION WITH COMPLICATION (MULTI): ICD-10-CM

## 2023-09-28 DIAGNOSIS — E44.0 MODERATE PROTEIN-CALORIE MALNUTRITION (MULTI): ICD-10-CM

## 2023-09-28 DIAGNOSIS — R76.8 ELEVATED ANTINUCLEAR ANTIBODY (ANA) LEVEL: ICD-10-CM

## 2023-09-28 DIAGNOSIS — R20.0 NUMBNESS AND TINGLING IN BOTH HANDS: ICD-10-CM

## 2023-09-28 DIAGNOSIS — R20.2 PARESTHESIAS: ICD-10-CM

## 2023-09-28 DIAGNOSIS — G62.1 ALCOHOL-INDUCED POLYNEUROPATHY (MULTI): ICD-10-CM

## 2023-09-28 DIAGNOSIS — F32.A DEPRESSIVE DISORDER: ICD-10-CM

## 2023-09-28 DIAGNOSIS — F10.229 ALCOHOL DEPENDENCE WITH INTOXICATION WITH COMPLICATION (MULTI): Primary | ICD-10-CM

## 2023-09-28 DIAGNOSIS — R56.9 SEIZURE (MULTI): ICD-10-CM

## 2023-09-28 DIAGNOSIS — R20.2 NUMBNESS AND TINGLING IN BOTH HANDS: ICD-10-CM

## 2023-09-28 PROBLEM — M06.9 ARTHRITIS, RHEUMATOID (MULTI): Status: RESOLVED | Noted: 2023-09-06 | Resolved: 2023-09-28

## 2023-09-28 LAB
ALANINE AMINOTRANSFERASE (SGPT) (U/L) IN SER/PLAS: 71 U/L (ref 7–45)
ALBUMIN (G/DL) IN SER/PLAS: 4.7 G/DL (ref 3.4–5)
ALKALINE PHOSPHATASE (U/L) IN SER/PLAS: 148 U/L (ref 33–110)
ANION GAP IN SER/PLAS: 14 MMOL/L (ref 10–20)
ASPARTATE AMINOTRANSFERASE (SGOT) (U/L) IN SER/PLAS: 214 U/L (ref 9–39)
BASOPHILS (10*3/UL) IN BLOOD BY AUTOMATED COUNT: 0.12 X10E9/L (ref 0–0.1)
BASOPHILS/100 LEUKOCYTES IN BLOOD BY AUTOMATED COUNT: 1 % (ref 0–2)
BILIRUBIN TOTAL (MG/DL) IN SER/PLAS: 0.8 MG/DL (ref 0–1.2)
CALCIUM (MG/DL) IN SER/PLAS: 10.1 MG/DL (ref 8.6–10.3)
CARBON DIOXIDE, TOTAL (MMOL/L) IN SER/PLAS: 25 MMOL/L (ref 21–32)
CHLORIDE (MMOL/L) IN SER/PLAS: 105 MMOL/L (ref 98–107)
CREATININE (MG/DL) IN SER/PLAS: 0.59 MG/DL (ref 0.5–1.05)
EOSINOPHILS (10*3/UL) IN BLOOD BY AUTOMATED COUNT: 0.1 X10E9/L (ref 0–0.7)
EOSINOPHILS/100 LEUKOCYTES IN BLOOD BY AUTOMATED COUNT: 0.9 % (ref 0–6)
ERYTHROCYTE DISTRIBUTION WIDTH (RATIO) BY AUTOMATED COUNT: 14.4 % (ref 11.5–14.5)
ERYTHROCYTE MEAN CORPUSCULAR HEMOGLOBIN CONCENTRATION (G/DL) BY AUTOMATED: 33.1 G/DL (ref 32–36)
ERYTHROCYTE MEAN CORPUSCULAR VOLUME (FL) BY AUTOMATED COUNT: 104 FL (ref 80–100)
ERYTHROCYTES (10*6/UL) IN BLOOD BY AUTOMATED COUNT: 4.84 X10E12/L (ref 4–5.2)
GFR FEMALE: >90 ML/MIN/1.73M2
GLUCOSE (MG/DL) IN SER/PLAS: 105 MG/DL (ref 74–99)
HEMATOCRIT (%) IN BLOOD BY AUTOMATED COUNT: 50.2 % (ref 36–46)
HEMOGLOBIN (G/DL) IN BLOOD: 16.6 G/DL (ref 12–16)
IMMATURE GRANULOCYTES/100 LEUKOCYTES IN BLOOD BY AUTOMATED COUNT: 0.3 % (ref 0–0.9)
LEUKOCYTES (10*3/UL) IN BLOOD BY AUTOMATED COUNT: 11.5 X10E9/L (ref 4.4–11.3)
LYMPHOCYTES (10*3/UL) IN BLOOD BY AUTOMATED COUNT: 2.69 X10E9/L (ref 1.2–4.8)
LYMPHOCYTES/100 LEUKOCYTES IN BLOOD BY AUTOMATED COUNT: 23.3 % (ref 13–44)
MONOCYTES (10*3/UL) IN BLOOD BY AUTOMATED COUNT: 0.76 X10E9/L (ref 0.1–1)
MONOCYTES/100 LEUKOCYTES IN BLOOD BY AUTOMATED COUNT: 6.6 % (ref 2–10)
NEUTROPHILS (10*3/UL) IN BLOOD BY AUTOMATED COUNT: 7.84 X10E9/L (ref 1.2–7.7)
NEUTROPHILS/100 LEUKOCYTES IN BLOOD BY AUTOMATED COUNT: 67.9 % (ref 40–80)
PLATELETS (10*3/UL) IN BLOOD AUTOMATED COUNT: 290 X10E9/L (ref 150–450)
POTASSIUM (MMOL/L) IN SER/PLAS: 4.1 MMOL/L (ref 3.5–5.3)
PROTEIN TOTAL: 7.4 G/DL (ref 6.4–8.2)
SODIUM (MMOL/L) IN SER/PLAS: 140 MMOL/L (ref 136–145)
URATE (MG/DL) IN SER/PLAS: 5.5 MG/DL (ref 2.3–6.7)
UREA NITROGEN (MG/DL) IN SER/PLAS: 6 MG/DL (ref 6–23)

## 2023-09-28 PROCEDURE — 84550 ASSAY OF BLOOD/URIC ACID: CPT

## 2023-09-28 PROCEDURE — 4004F PT TOBACCO SCREEN RCVD TLK: CPT | Performed by: INTERNAL MEDICINE

## 2023-09-28 PROCEDURE — 80053 COMPREHEN METABOLIC PANEL: CPT

## 2023-09-28 PROCEDURE — 99215 OFFICE O/P EST HI 40 MIN: CPT | Performed by: INTERNAL MEDICINE

## 2023-09-28 PROCEDURE — 36415 COLL VENOUS BLD VENIPUNCTURE: CPT

## 2023-09-28 PROCEDURE — 86431 RHEUMATOID FACTOR QUANT: CPT

## 2023-09-28 PROCEDURE — 85025 COMPLETE CBC W/AUTO DIFF WBC: CPT

## 2023-09-28 RX ORDER — DULOXETIN HYDROCHLORIDE 30 MG/1
CAPSULE, DELAYED RELEASE ORAL
Qty: 60 CAPSULE | Refills: 3 | Status: SHIPPED | OUTPATIENT
Start: 2023-09-28 | End: 2023-12-13 | Stop reason: ALTCHOICE

## 2023-09-28 ASSESSMENT — PAIN SCALES - GENERAL: PAINLEVEL: 0-NO PAIN

## 2023-09-28 NOTE — ASSESSMENT & PLAN NOTE
Referred to Dr Da Silva for ongoing neuro muscular issues, patient did see Dr Huber for initial testing. May increase the gabapentin to 300 mg TID if needed, she may resume duloxetine as long as she is not drinking alcohol, recheck labs in 3 months

## 2023-09-28 NOTE — ASSESSMENT & PLAN NOTE
Patient developed isolated symptoms earlier this week, she did not lose consciousness, she did not fall, symptoms lasted for a few minutes.  Patient was drinking alcohol at the time.  Advise follow up with neurology for reassessment, this may have been due to longstanding alcohol abuse

## 2023-09-28 NOTE — PATIENT INSTRUCTIONS

## 2023-09-28 NOTE — PROGRESS NOTES
"Chief Complaint/HPI:  Alcohol-induced polyneuropathy: She is day-4 of alcohol abstinence. No recent alcohol withdrawal symptoms reported. She is currently on Gabapentin and is no longer on Cymbalta. She is also taking B12, Folate, and Thiamine. Gabapentin was recently increased from 100 mg to 200 mg taken TID. Recent lab-work showcasing a sed rate of 5, negative CRP, QUANG positive, negative serum protein electrophoresis, Alk Phos 139, and AST of 58. She reports that the dose increase has helped but will not provide complete relief when her polyneuropathy-related pain exacerbates. She was referred to and seen by Neurology, Dr. Huber. She was recommended a further dosage increase to her Gabapentin and resuming Cymbalta; however, she is hesitant due to a history of substance abuse. In addition, Neuronal (V-G) K+ Channel Ab was positive. A CT chest w/ contrast was ordered to rule out paraneoplastic causes. She has seen a neuro-muscular specialist and is willing to follow up with them to discuss these findings. She will follow up with Dr Jewel Da Silva for neuromuscular issues. She has not been able to schedule appointments yet due to scheduling issues.  Patient has already seen Dr Huber     Tobacco-use: Formerly 1.5 PPD, reduced to 1/2 pack. CXR 9/6/23 - \"No evidence of acute cardiopulmonary process. \"     Generalized abd pain/Vomiting/Weight-loss: She is no longer on Prilosec 40 mg due to improvement to her abdominal pain. She has not yet been able to schedule a visit with a GI specialist, Counseling, or a Nutritionist.     Depression/Anxiety: She is no longer on Cymbalta due to alcohol-use. She denies of any recent depressed mood. She has been taking walks and working with animals to manage her psychiatric symptoms.    Patient has not fallen recently , she gets occasionally wobbly but she denies falling       ROS otherwise negative aside from what was mentioned above in HPI.      Patient Active Problem List   Diagnosis    " Tobacco use    Alcohol dependence (CMS/HCC)    Marijuana dependence (CMS/HCC)    Generalized abdominal pain    Numbness and tingling in both hands    Ataxia    Uterine fibroid    Weight loss, unintentional    Hepatic steatosis    Moderate protein-calorie malnutrition (CMS/HCC)    Vomiting    Alcohol withdrawal syndrome (CMS/HCC)    Alcoholic ketoacidosis    Allergy    Alcohol-induced polyneuropathy (CMS/HCC)    Amenorrhea    Depression    Paresthesias    Seizure (CMS/HCC)    Sensory ganglionopathy         History reviewed. No pertinent past medical history.  Past Surgical History:   Procedure Laterality Date    TONSILLECTOMY Bilateral     TUBAL LIGATION       Social History     Social History Narrative    Not on file         ALLERGIES  Penicillins      MEDICATIONS  Current Outpatient Medications on File Prior to Visit   Medication Sig Dispense Refill    amLODIPine (Norvasc) 10 mg tablet Take one daily 90 tablet 2    cloNIDine (Catapres) 0.1 mg tablet Take 1 tablet (0.1 mg) by mouth 2 times a day. 180 tablet 2    cyanocobalamin (Vitamin B-12) 1,000 mcg tablet TAKE 1 TABLET BY MOUTH EVERY DAY 30 tablet 3    folic acid (Folvite) 1 mg tablet Take 1 tablet (1 mg) by mouth once daily. 30 tablet 11    gabapentin (Neurontin) 100 mg capsule Take 2 capsules (200 mg) by mouth 3 times a day. 180 capsule 3    ondansetron ODT (Zofran-ODT) 8 mg disintegrating tablet Take 1 tablet (8 mg) by mouth every 8 hours if needed for vomiting.      omeprazole (PriLOSEC) 40 mg DR capsule Take 1 capsule (40 mg) by mouth once daily.      thiamine (Vitamin B-1) 100 mg tablet Take 1 tablet (100 mg) by mouth once daily. 30 tablet 11    [DISCONTINUED] DULoxetine (Cymbalta) 30 mg DR capsule Take 1 capsule (30 mg) by mouth once daily. Do not crush or chew. Take 1 capsule daily for 7 days, then increase to 2 capsules daily (Patient not taking: Reported on 9/28/2023) 60 capsule 3     Current Facility-Administered Medications on File Prior to Visit  "  Medication Dose Route Frequency Provider Last Rate Last Admin    pyridoxine (Vitamin B-6) tablet 25 mg  25 mg oral Once Javier Roberts MD             PHYSICAL EXAM  /78 (BP Location: Left arm, Patient Position: Sitting, BP Cuff Size: Small child)   Pulse 99   Ht 1.651 m (5' 5\")   Wt 50.3 kg (111 lb)   SpO2 95%   BMI 18.47 kg/m²   Body mass index is 18.47 kg/m².  Gen: Alert, NAD, she is talkative, edentulous  HEENT:  conjunctiva and sclera normal in appearance, wears glasses  Respiratory:  Lungs CTAB, no wheezes, no rhonchi, no rales  Cardiovascular:  Heart RRR. No M/R/G  Neuro:  Gross motor intact, no ambulation issues   Skin:  No suspicious lesions present on exposed skin    ASSESSMENT/PLAN  Problem List Items Addressed This Visit       Tobacco use    Relevant Orders    Referral to Neurology    Comprehensive metabolic panel    CBC and Auto Differential    Alcohol dependence (CMS/HCC) - Primary    Current Assessment & Plan     She is stable now, continue abstinence from alcohol, encourage smoking cessation also         Relevant Orders    Referral to Neurology    Comprehensive metabolic panel    CBC and Auto Differential    Uric acid    Numbness and tingling in both hands    Relevant Medications    DULoxetine (Cymbalta) 30 mg DR capsule    Other Relevant Orders    Comprehensive metabolic panel    CBC and Auto Differential    Rheumatoid factor    Moderate protein-calorie malnutrition (CMS/HCC)    Current Assessment & Plan     She is stable now, continue abstinence from alcohol, encourage smoking cessation also         Relevant Orders    Comprehensive metabolic panel    CBC and Auto Differential    Alcohol-induced polyneuropathy (CMS/HCC)    Relevant Orders    Referral to Neurology    Comprehensive metabolic panel    CBC and Auto Differential    Paresthesias    Relevant Orders    Referral to Neurology    Comprehensive metabolic panel    CBC and Auto Differential    Rheumatoid factor    Seizure " "(CMS/[facility])    Current Assessment & Plan     Patient developed isolated symptoms earlier this week, she did not lose consciousness, she did not fall, symptoms lasted for a few minutes.  Patient was drinking alcohol at the time.  Advise follow up with neurology for reassessment, this may have been due to longstanding alcohol abuse         Relevant Orders    Comprehensive metabolic panel    CBC and Auto Differential    Sensory ganglionopathy    Current Assessment & Plan     Referred to Dr Da Sliva for ongoing neuro muscular issues, patient did see Dr Huber for initial testing. May increase the gabapentin to 300 mg TID if needed, she may resume duloxetine as long as she is not drinking alcohol, recheck labs in 3 months         Relevant Orders    Referral to Neurology    Comprehensive metabolic panel    CBC and Auto Differential     Other Visit Diagnoses       Elevated antinuclear antibody (QUANG) level        Relevant Orders    Referral to Neurology    Comprehensive metabolic panel    CBC and Auto Differential    Uric acid    Rheumatoid factor    Depressive disorder        Relevant Medications    DULoxetine (Cymbalta) 30 mg DR capsule    Other Relevant Orders    Comprehensive metabolic panel    CBC and Auto Differential        Patient had isolated swelling of the right hand in the past, patient was told that she had \"rheumatoid arthritis\". I question this diagnosis since it was an acute monoarticular swelling. This may have been an episode of gout or pseudogout given patient's history of alcohol abuse, will check a uric acid level and a rheumatoid factor. Schedule CT of the chest as ordered by Dr Huber.  Follow up with Dr Da Silva also    Follow up with gyn for evaluation also to evaluate for paraneoplastic syndrome    Javier Roberts MD   "

## 2023-09-28 NOTE — PROGRESS NOTES
"Chief Complaint/HPI:    Alcohol-induced polyneuropathy: She is day-4 of alcohol abstinence. No recent alcohol withdrawal symptoms reported. She is currently on Gabapentin and is no longer on Cymbalta. She is also taking B12, Folate, and Thiamine. Gabapentin was recently increased from 100 mg to 200 mg taken TID. Recent lab-work showcasing a sed rate of 5, negative CRP, QUANG positive, negative serum protein electrophoresis, Alk Phos 139, and AST of 58. She reports that the dose increase has helped but will not provide complete relief when her polyneuropathy-related pain exacerbates. She was referred to and seen by Neurology, Dr. Huber. She was recommended a further dosage increase to her Gabapentin and resuming Cymbalta; however, she is hesitant due to a history of substance abuse. In addition, Neuronal (V-G) K+ Channel Ab was positive. A CT chest w/ contrast was ordered to rule out paraneoplastic causes. She has seen a neuro-muscular specialist and is willing to follow up with them to discuss these findings.    Tobacco-use: Formerly 1.5 PPD, reduced to 1/2 pack. CXR 9/6/23 - \"No evidence of acute cardiopulmonary process. \"    Generalized abd pain/Vomiting/Weight-loss: She is no longer on Prilosec 40 mg due to improvement to her abdominal pain. She has not yet been able to schedule a visit with a GI specialist, Counseling, or a Nutritionist.    Depression/Anxiety: She is no longer on Cymbalta due to alcohol-use. She denies of any recent depressed mood. She has been taking walks and working with animals to manage her psychiatric symptoms.    ROS otherwise negative aside from what was mentioned above in HPI.      Patient Active Problem List   Diagnosis    Tobacco use    Alcohol dependence (CMS/HCC)    Marijuana dependence (CMS/HCC)    Generalized abdominal pain    Numbness and tingling in both hands    Ataxia    Uterine fibroid    Weight loss, unintentional    Hepatic steatosis    Moderate protein-calorie malnutrition " (CMS/HCC)    Vomiting    Alcohol withdrawal syndrome (CMS/HCC)    Alcoholic ketoacidosis    Allergy    Alcohol-induced polyneuropathy (CMS/HCC)    Amenorrhea    Arthritis, rheumatoid (CMS/HCC)    Depression    Paresthesias    Seizure (CMS/HCC)    Sensory ganglionopathy         No past medical history on file.  Past Surgical History:   Procedure Laterality Date    TONSILLECTOMY Bilateral     TUBAL LIGATION       Social History     Social History Narrative    Not on file         ALLERGIES  Penicillins      MEDICATIONS  Current Outpatient Medications on File Prior to Visit   Medication Sig Dispense Refill    amLODIPine (Norvasc) 10 mg tablet Take one daily 90 tablet 2    cloNIDine (Catapres) 0.1 mg tablet Take 1 tablet (0.1 mg) by mouth 2 times a day. 180 tablet 2    cyanocobalamin (Vitamin B-12) 1,000 mcg tablet TAKE 1 TABLET BY MOUTH EVERY DAY 30 tablet 3    DULoxetine (Cymbalta) 30 mg DR capsule Take 1 capsule (30 mg) by mouth once daily. Do not crush or chew. Take 1 capsule daily for 7 days, then increase to 2 capsules daily 60 capsule 3    folic acid (Folvite) 1 mg tablet Take 1 tablet (1 mg) by mouth once daily. 30 tablet 11    gabapentin (Neurontin) 100 mg capsule Take 2 capsules (200 mg) by mouth 3 times a day. 180 capsule 3    omeprazole (PriLOSEC) 40 mg DR capsule Take 1 capsule (40 mg) by mouth once daily.      ondansetron ODT (Zofran-ODT) 8 mg disintegrating tablet Take 1 tablet (8 mg) by mouth every 8 hours if needed for vomiting.      thiamine (Vitamin B-1) 100 mg tablet Take 1 tablet (100 mg) by mouth once daily. 30 tablet 11     Current Facility-Administered Medications on File Prior to Visit   Medication Dose Route Frequency Provider Last Rate Last Admin    pyridoxine (Vitamin B-6) tablet 25 mg  25 mg oral Once Javier Roberts MD             PHYSICAL EXAM  There were no vitals taken for this visit.  There is no height or weight on file to calculate BMI.  Gen: Alert, NAD  HEENT:  SIN  conjunctiva and sclera normal in appearance, wears glasses  Respiratory:  Lungs CTAB  Cardiovascular:  Heart RRR. No M/R/G  Neuro:  Gross motor and sensory intact  Skin:  No suspicious lesions present    ASSESSMENT/PLAN  Problem List Items Addressed This Visit    None        Chi Richard

## 2023-09-29 ENCOUNTER — TELEPHONE (OUTPATIENT)
Dept: PRIMARY CARE | Facility: CLINIC | Age: 48
End: 2023-09-29
Payer: COMMERCIAL

## 2023-09-29 LAB — RHEUMATOID FACTOR (IU/ML) IN SERUM OR PLASMA: 10 IU/ML (ref 0–15)

## 2023-09-29 NOTE — TELEPHONE ENCOUNTER
Patient is very concerned about lab work results.  Patient is requesting a call back to go over results.  Patient would like to move up follow up appointment, if necessary.  Will discuss at call back.

## 2023-09-29 NOTE — TELEPHONE ENCOUNTER
Calmed pt, down and explained lab results. She is ok and will work on what she needs to for her next appointment!

## 2023-11-06 DIAGNOSIS — M79.2 PERIPHERAL NEUROPATHIC PAIN: ICD-10-CM

## 2023-11-06 DIAGNOSIS — F10.220 ALCOHOL DEPENDENCE WITH UNCOMPLICATED INTOXICATION (MULTI): ICD-10-CM

## 2023-11-06 DIAGNOSIS — R20.0 NUMBNESS AND TINGLING IN BOTH HANDS: ICD-10-CM

## 2023-11-06 DIAGNOSIS — R20.2 NUMBNESS AND TINGLING IN BOTH HANDS: ICD-10-CM

## 2023-11-06 RX ORDER — GABAPENTIN 100 MG/1
CAPSULE ORAL
Qty: 180 CAPSULE | Refills: 1 | Status: SHIPPED | OUTPATIENT
Start: 2023-11-06 | End: 2024-01-19

## 2023-11-07 ENCOUNTER — TELEPHONE (OUTPATIENT)
Dept: PRIMARY CARE | Facility: CLINIC | Age: 48
End: 2023-11-07
Payer: COMMERCIAL

## 2023-11-07 DIAGNOSIS — G54.9 SENSORY GANGLIONOPATHY: Primary | ICD-10-CM

## 2023-11-07 PROBLEM — Z01.419 WELL WOMAN EXAM: Status: ACTIVE | Noted: 2023-11-07

## 2023-11-08 ENCOUNTER — APPOINTMENT (OUTPATIENT)
Dept: OBSTETRICS AND GYNECOLOGY | Facility: CLINIC | Age: 48
End: 2023-11-08
Payer: COMMERCIAL

## 2023-11-16 ENCOUNTER — APPOINTMENT (OUTPATIENT)
Dept: RADIOLOGY | Facility: CLINIC | Age: 48
End: 2023-11-16
Payer: COMMERCIAL

## 2023-11-22 DIAGNOSIS — I10 PRIMARY HYPERTENSION: ICD-10-CM

## 2023-11-22 PROBLEM — F17.200 TOBACCO USE DISORDER: Status: ACTIVE | Noted: 2023-11-22

## 2023-11-22 PROBLEM — R52 PAIN: Status: ACTIVE | Noted: 2023-11-22

## 2023-11-22 PROBLEM — D21.9 FIBROID: Status: ACTIVE | Noted: 2023-11-22

## 2023-11-22 PROBLEM — N95.1 MENOPAUSAL SYMPTOMS: Status: ACTIVE | Noted: 2023-11-22

## 2023-11-22 PROBLEM — R11.2 NAUSEA AND VOMITING: Status: ACTIVE | Noted: 2023-11-22

## 2023-11-22 PROBLEM — K52.9 GASTROENTERITIS: Status: ACTIVE | Noted: 2023-11-22

## 2023-11-22 PROBLEM — F12.90 MARIJUANA USE: Status: ACTIVE | Noted: 2023-11-22

## 2023-11-22 PROBLEM — F10.10 ALCOHOL ABUSE: Status: ACTIVE | Noted: 2023-11-22

## 2023-11-22 RX ORDER — AMLODIPINE BESYLATE 10 MG/1
TABLET ORAL
Qty: 90 TABLET | Refills: 2 | Status: SHIPPED | OUTPATIENT
Start: 2023-11-22 | End: 2024-06-07 | Stop reason: SDUPTHER

## 2023-12-06 ENCOUNTER — DOCUMENTATION (OUTPATIENT)
Dept: NEUROLOGY | Facility: HOSPITAL | Age: 48
End: 2023-12-06
Payer: COMMERCIAL

## 2023-12-06 NOTE — PROGRESS NOTES
12/6/23  Was informed of CT chest denial by insurance dated 11/17/23  just today after pt called office.  Previous CXR normal.  Apparently no documentation of abn CXR.  Peer to peer window has passed (5 days)  Nothing I can do with this even with appeal  She has appointment with neuromuscular in 1/2024. They can reorder then if still needed.  Will have staff let pt know    Dr inman

## 2023-12-11 ENCOUNTER — APPOINTMENT (OUTPATIENT)
Dept: OBSTETRICS AND GYNECOLOGY | Facility: CLINIC | Age: 48
End: 2023-12-11
Payer: COMMERCIAL

## 2023-12-13 ENCOUNTER — OFFICE VISIT (OUTPATIENT)
Dept: PRIMARY CARE | Facility: CLINIC | Age: 48
End: 2023-12-13
Payer: COMMERCIAL

## 2023-12-13 ENCOUNTER — APPOINTMENT (OUTPATIENT)
Dept: OBSTETRICS AND GYNECOLOGY | Facility: CLINIC | Age: 48
End: 2023-12-13
Payer: COMMERCIAL

## 2023-12-13 ENCOUNTER — LAB (OUTPATIENT)
Dept: LAB | Facility: LAB | Age: 48
End: 2023-12-13
Payer: COMMERCIAL

## 2023-12-13 VITALS
HEART RATE: 90 BPM | BODY MASS INDEX: 20.61 KG/M2 | TEMPERATURE: 97.7 F | RESPIRATION RATE: 16 BRPM | HEIGHT: 65 IN | SYSTOLIC BLOOD PRESSURE: 108 MMHG | OXYGEN SATURATION: 98 % | WEIGHT: 123.7 LBS | DIASTOLIC BLOOD PRESSURE: 72 MMHG

## 2023-12-13 DIAGNOSIS — C80.1 PARANEOPLASTIC NEUROLOGIC DISORDER (MULTI): ICD-10-CM

## 2023-12-13 DIAGNOSIS — Z72.0 TOBACCO USE: ICD-10-CM

## 2023-12-13 DIAGNOSIS — R20.2 PARESTHESIAS: ICD-10-CM

## 2023-12-13 DIAGNOSIS — R76.8 VOLTAGE-GATED POTASSIUM CHANNEL (VGKC) ANTIBODY POSITIVE: ICD-10-CM

## 2023-12-13 DIAGNOSIS — F32.A DEPRESSION, UNSPECIFIED DEPRESSION TYPE: ICD-10-CM

## 2023-12-13 DIAGNOSIS — R20.0 NUMBNESS AND TINGLING IN BOTH HANDS: ICD-10-CM

## 2023-12-13 DIAGNOSIS — E44.0 MODERATE PROTEIN-CALORIE MALNUTRITION (MULTI): ICD-10-CM

## 2023-12-13 DIAGNOSIS — G54.9 SENSORY GANGLIONOPATHY: ICD-10-CM

## 2023-12-13 DIAGNOSIS — D25.9 UTERINE LEIOMYOMA, UNSPECIFIED LOCATION: ICD-10-CM

## 2023-12-13 DIAGNOSIS — R20.2 NUMBNESS AND TINGLING IN BOTH HANDS: ICD-10-CM

## 2023-12-13 DIAGNOSIS — R76.8 VOLTAGE-GATED POTASSIUM CHANNEL (VGKC) ANTIBODY POSITIVE: Primary | ICD-10-CM

## 2023-12-13 DIAGNOSIS — F10.229 ALCOHOL DEPENDENCE WITH INTOXICATION WITH COMPLICATION (MULTI): ICD-10-CM

## 2023-12-13 DIAGNOSIS — G98.8 PARANEOPLASTIC NEUROLOGIC DISORDER (MULTI): ICD-10-CM

## 2023-12-13 LAB
ALBUMIN SERPL BCP-MCNC: 4 G/DL (ref 3.4–5)
ALP SERPL-CCNC: 110 U/L (ref 33–110)
ALT SERPL W P-5'-P-CCNC: 24 U/L (ref 7–45)
ANION GAP SERPL CALC-SCNC: 14 MMOL/L (ref 10–20)
AST SERPL W P-5'-P-CCNC: 57 U/L (ref 9–39)
BASOPHILS # BLD AUTO: 0.09 X10*3/UL (ref 0–0.1)
BASOPHILS NFR BLD AUTO: 1 %
BILIRUB SERPL-MCNC: 0.4 MG/DL (ref 0–1.2)
BUN SERPL-MCNC: 7 MG/DL (ref 6–23)
CALCIUM SERPL-MCNC: 9.2 MG/DL (ref 8.6–10.3)
CHLORIDE SERPL-SCNC: 103 MMOL/L (ref 98–107)
CHOLEST SERPL-MCNC: 180 MG/DL (ref 0–199)
CHOLESTEROL/HDL RATIO: 4.2
CO2 SERPL-SCNC: 26 MMOL/L (ref 21–32)
CREAT SERPL-MCNC: 0.5 MG/DL (ref 0.5–1.05)
CREAT UR-MCNC: 191.3 MG/DL (ref 20–320)
EOSINOPHIL # BLD AUTO: 0.12 X10*3/UL (ref 0–0.7)
EOSINOPHIL NFR BLD AUTO: 1.4 %
ERYTHROCYTE [DISTWIDTH] IN BLOOD BY AUTOMATED COUNT: 13 % (ref 11.5–14.5)
GFR SERPL CREATININE-BSD FRML MDRD: >90 ML/MIN/1.73M*2
GLUCOSE SERPL-MCNC: 83 MG/DL (ref 74–99)
HCT VFR BLD AUTO: 47.6 % (ref 36–46)
HDLC SERPL-MCNC: 42.5 MG/DL
HGB BLD-MCNC: 15.4 G/DL (ref 12–16)
IMM GRANULOCYTES # BLD AUTO: 0.04 X10*3/UL (ref 0–0.7)
IMM GRANULOCYTES NFR BLD AUTO: 0.5 % (ref 0–0.9)
LDLC SERPL CALC-MCNC: 83 MG/DL
LYMPHOCYTES # BLD AUTO: 3.13 X10*3/UL (ref 1.2–4.8)
LYMPHOCYTES NFR BLD AUTO: 36 %
MCH RBC QN AUTO: 32.9 PG (ref 26–34)
MCHC RBC AUTO-ENTMCNC: 32.4 G/DL (ref 32–36)
MCV RBC AUTO: 102 FL (ref 80–100)
MICROALBUMIN UR-MCNC: 16.2 MG/L
MICROALBUMIN/CREAT UR: 8.5 UG/MG CREAT
MONOCYTES # BLD AUTO: 0.52 X10*3/UL (ref 0.1–1)
MONOCYTES NFR BLD AUTO: 6 %
NEUTROPHILS # BLD AUTO: 4.8 X10*3/UL (ref 1.2–7.7)
NEUTROPHILS NFR BLD AUTO: 55.1 %
NON HDL CHOLESTEROL: 138 MG/DL (ref 0–149)
NRBC BLD-RTO: 0 /100 WBCS (ref 0–0)
PLATELET # BLD AUTO: 281 X10*3/UL (ref 150–450)
POTASSIUM SERPL-SCNC: 3.7 MMOL/L (ref 3.5–5.3)
PROT SERPL-MCNC: 6.6 G/DL (ref 6.4–8.2)
RBC # BLD AUTO: 4.68 X10*6/UL (ref 4–5.2)
SODIUM SERPL-SCNC: 139 MMOL/L (ref 136–145)
TRIGL SERPL-MCNC: 275 MG/DL (ref 0–149)
VIT B12 SERPL-MCNC: 953 PG/ML (ref 211–911)
VLDL: 55 MG/DL (ref 0–40)
WBC # BLD AUTO: 8.7 X10*3/UL (ref 4.4–11.3)

## 2023-12-13 PROCEDURE — 82043 UR ALBUMIN QUANTITATIVE: CPT

## 2023-12-13 PROCEDURE — 80053 COMPREHEN METABOLIC PANEL: CPT

## 2023-12-13 PROCEDURE — 4004F PT TOBACCO SCREEN RCVD TLK: CPT | Performed by: INTERNAL MEDICINE

## 2023-12-13 PROCEDURE — 99215 OFFICE O/P EST HI 40 MIN: CPT | Performed by: INTERNAL MEDICINE

## 2023-12-13 PROCEDURE — 82570 ASSAY OF URINE CREATININE: CPT

## 2023-12-13 PROCEDURE — 36415 COLL VENOUS BLD VENIPUNCTURE: CPT

## 2023-12-13 PROCEDURE — 80061 LIPID PANEL: CPT

## 2023-12-13 PROCEDURE — 85025 COMPLETE CBC W/AUTO DIFF WBC: CPT

## 2023-12-13 PROCEDURE — 82607 VITAMIN B-12: CPT

## 2023-12-13 RX ORDER — NORTRIPTYLINE HYDROCHLORIDE 25 MG/1
25 CAPSULE ORAL NIGHTLY
Qty: 30 CAPSULE | Refills: 11 | Status: SHIPPED | OUTPATIENT
Start: 2023-12-13 | End: 2024-06-07 | Stop reason: SDUPTHER

## 2023-12-13 NOTE — ASSESSMENT & PLAN NOTE
Reordered the CT chest to try to evaluate for paraneoplastic syndrome, will see if it is covered by insurance provider. Patient is a smoker also

## 2023-12-13 NOTE — PROGRESS NOTES
"Subjective   Patient ID: Maru Huynh is a 47 y.o. female who presents for Follow-up (Pt thinks she has a sinus infection and wants you to look in her ears. Its been going on for three days/) and Depression (Wants to talk about the duloxetine. She's cut back tremendously on alcohol.).    HPI  Sinus pressure: Patient has had 3 days of sinus pain,  bilateral ear pain and malaise. She does have phlegm produced occasionally with forceful coughing. Denies fever, chills, chest pain, SOB or myalgias.    Health maintenance: Mammogram is up to date, without evidence of malignancy.   Tobacco-use: Formerly 1.5 PPD, reduced to 1 pack.   Alcohol: Reports that she drinks on average 3 pints of liquor per week.     Alcohol-induced polyneuropathy:  She is currently on Gabapentin 200 mg TID.  She is also taking B12, Folate, and Thiamine. She does not always take the gabapentin. Recent labs showed Alk Phos 148, and AST of 214, ALT of 71. Patient has already seen Dr Huber. He recommended that she follow up with Dr. Da Silva in January 19th of 2024.  He had also ordered a CT with contrast of the chest to evaluate for possible mass, however it was denied by her insurance. She stated that she is going to have the CT scan resubmitted at that visit. She would like to go back to physical therapy if possible.  She has started 12 step program with her friend, she does talk to her friend daily. She is having a difficult time quitting alcohol use. The numbness is very annoying , \"I burn my hands constantly\".    A CT chest w/ contrast was ordered to rule out paraneoplastic causes.  She will follow up with Dr Jewel Da Silva for neuromuscular issues.     Patient has to follow up with gyn also to have her uterine fibroids reassessed. Concern at this time is that the patient may have an additional cause of paraneoplastic syndrome      Depression/Anxiety: She is no longer on Cymbalta due to alcohol-use. She denies of any recent depressed mood. She has " been taking walks and working with animals to manage her psychiatric symptoms. She would like to resume the medication since she has decreased alcohol use significantly.    HTN: Currently on amlodipine 10 mg and . Tolerating well.   BP today in triage showed 108/72.   Denies any headaches, dizziness, vision changes, chest pain or pressure, palpitations, SOB, POTTER, LE edema, or any other complaints.     Current Outpatient Medications on File Prior to Visit   Medication Sig Dispense Refill    amLODIPine (Norvasc) 10 mg tablet TAKE 1 TABLET BY MOUTH DAILY 90 tablet 2    cloNIDine (Catapres) 0.1 mg tablet Take 1 tablet (0.1 mg) by mouth 2 times a day. 180 tablet 2    cyanocobalamin (Vitamin B-12) 1,000 mcg tablet TAKE 1 TABLET BY MOUTH EVERY DAY 30 tablet 3    folic acid (Folvite) 1 mg tablet Take 1 tablet (1 mg) by mouth once daily. 30 tablet 11    gabapentin (Neurontin) 100 mg capsule TAKE 2 CAPSULES(200 MG) BY MOUTH THREE TIMES DAILY 180 capsule 1    ondansetron ODT (Zofran-ODT) 8 mg disintegrating tablet Take 1 tablet (8 mg) by mouth every 8 hours if needed for vomiting.      thiamine (Vitamin B-1) 100 mg tablet Take 1 tablet (100 mg) by mouth once daily. 30 tablet 11    [DISCONTINUED] DULoxetine (Cymbalta) 30 mg DR capsule Do not crush or chew. Take 1 capsule daily for 7 days, then increase to 2 capsules daily, do not take if alcohol is resumed (Patient not taking: Reported on 12/13/2023) 60 capsule 3    [DISCONTINUED] omeprazole (PriLOSEC) 40 mg DR capsule Take 1 capsule (40 mg) by mouth once daily.       Current Facility-Administered Medications on File Prior to Visit   Medication Dose Route Frequency Provider Last Rate Last Admin    pyridoxine (Vitamin B-6) tablet 25 mg  25 mg oral Once Javier Roberts MD            Allergies   Allergen Reactions    Penicillins Hives and Rash         There is no immunization history on file for this patient.      Review of Systems  All pertinent positive symptoms are  "included in the history of present illness.  All other systems have been reviewed and are negative and noncontributory to this patient's current ailments.     Objective   /72 (BP Location: Left arm, Patient Position: Sitting, BP Cuff Size: Adult)   Pulse 90   Temp 36.5 °C (97.7 °F)   Resp 16   Ht 1.651 m (5' 5\")   Wt 56.1 kg (123 lb 11.2 oz)   SpO2 98%   BMI 20.58 kg/m²   BSA: 1.6 meters squared  No visits with results within 1 Month(s) from this visit.   Latest known visit with results is:   Lab on 09/28/2023   Component Date Value Ref Range Status    Glucose 09/28/2023 105 (H)  74 - 99 mg/dL Final    Sodium 09/28/2023 140  136 - 145 mmol/L Final    Potassium 09/28/2023 4.1  3.5 - 5.3 mmol/L Final    Chloride 09/28/2023 105  98 - 107 mmol/L Final    Bicarbonate 09/28/2023 25  21 - 32 mmol/L Final    Anion Gap 09/28/2023 14  10 - 20 mmol/L Final    Urea Nitrogen 09/28/2023 6  6 - 23 mg/dL Final    Creatinine 09/28/2023 0.59  0.50 - 1.05 mg/dL Final    GFR Female 09/28/2023 >90  >90 mL/min/1.73m2 Final     CALCULATIONS OF ESTIMATED GFR ARE PERFORMED   USING THE 2021 CKD-EPI STUDY REFIT EQUATION   WITHOUT THE RACE VARIABLE FOR THE IDMS-TRACEABLE   CREATININE METHODS.    https://jasn.asnjournals.org/content/early/2021/09/22/ASN.8941738172    Calcium 09/28/2023 10.1  8.6 - 10.3 mg/dL Final    Albumin 09/28/2023 4.7  3.4 - 5.0 g/dL Final    Alkaline Phosphatase 09/28/2023 148 (H)  33 - 110 U/L Final    Total Protein 09/28/2023 7.4  6.4 - 8.2 g/dL Final    AST 09/28/2023 214 (H)  9 - 39 U/L Final    Total Bilirubin 09/28/2023 0.8  0.0 - 1.2 mg/dL Final    ALT (SGPT) 09/28/2023 71 (H)  7 - 45 U/L Final     Patients treated with Sulfasalazine may generate    falsely decreased results for ALT.    WBC 09/28/2023 11.5 (H)  4.4 - 11.3 x10E9/L Final    RBC 09/28/2023 4.84  4.00 - 5.20 x10E12/L Final    Hemoglobin 09/28/2023 16.6 (H)  12.0 - 16.0 g/dL Final    Hematocrit 09/28/2023 50.2 (H)  36.0 - 46.0 % Final    " MCV 09/28/2023 104 (H)  80 - 100 fL Final    MCHC 09/28/2023 33.1  32.0 - 36.0 g/dL Final    Platelets 09/28/2023 290  150 - 450 x10E9/L Final    RDW 09/28/2023 14.4  11.5 - 14.5 % Final    Neutrophils % 09/28/2023 67.9  40.0 - 80.0 % Final    Immature Granulocytes %, Automated 09/28/2023 0.3  0.0 - 0.9 % Final     Immature Granulocyte Count (IG) includes promyelocytes,    myelocytes and metamyelocytes but does not include bands.   Percent differential counts (%) should be interpreted in the   context of the absolute cell counts (cells/L).    Lymphocytes % 09/28/2023 23.3  13.0 - 44.0 % Final    Monocytes % 09/28/2023 6.6  2.0 - 10.0 % Final    Eosinophils % 09/28/2023 0.9  0.0 - 6.0 % Final    Basophils % 09/28/2023 1.0  0.0 - 2.0 % Final    Neutrophils Absolute 09/28/2023 7.84 (H)  1.20 - 7.70 x10E9/L Final    Lymphocytes Absolute 09/28/2023 2.69  1.20 - 4.80 x10E9/L Final    Monocytes Absolute 09/28/2023 0.76  0.10 - 1.00 x10E9/L Final    Eosinophils Absolute 09/28/2023 0.10  0.00 - 0.70 x10E9/L Final    Basophils Absolute 09/28/2023 0.12 (H)  0.00 - 0.10 x10E9/L Final    Uric Acid 09/28/2023 5.5  2.3 - 6.7 mg/dL Final     Venipuncture immediately after or during the    administration of Metamizole may lead to falsely   low results. Testing should be performed immediately   prior to Metamizole dosing.    Rheumatoid Factor 09/28/2023 10  0 - 15 IU/mL Final       Physical Exam  CONSTITUTIONAL - well nourished, well developed, looks like stated age, in no acute distress, not ill-appearing, and not tired appearing  SKIN - normal skin color and pigmentation, normal skin turgor without rash, lesions, or nodules visualized on exposed skin  HEAD - no trauma, normocephalic, patient is edentulous   EYES - pupils are equal and reactive to light, extraocular muscles are intact, and normal external exam  CHEST - clear to auscultation, no wheezing, no crackles and no rales, good effort  CARDIAC - regular rate and regular  rhythm, no skipped beats, no murmur  ABDOMEN - no organomegaly, soft, nontender, nondistended, normal bowel sounds, no guarding/rebound/rigidity  EXTREMITIES - no edema, no deformities  NEUROLOGICAL -stocking glove sensory loss, the sensory loss is distal to med thighs bilaterally   PSYCHIATRIC - alert, pleasant and cordial, age-appropriate  IMMUNOLOGIC - no cervical lymphadenopathy     Assessment/Plan   Problem List Items Addressed This Visit             ICD-10-CM    Tobacco use Z72.0    Relevant Orders    CBC and Auto Differential    Comprehensive metabolic panel    Alcohol dependence (CMS/HCC) F10.20    Relevant Medications    nortriptyline (Pamelor) 25 mg capsule    Other Relevant Orders    CBC and Auto Differential    Comprehensive metabolic panel    Vitamin B12    Albumin, urine, random    Referral to Nutrition Services    Numbness and tingling in both hands R20.0, R20.2     Reordered the CT chest to try to evaluate for paraneoplastic syndrome, will see if it is covered by insurance provider. Patient is a smoker also         Relevant Medications    nortriptyline (Pamelor) 25 mg capsule    Other Relevant Orders    CT chest w IV contrast    CBC and Auto Differential    Comprehensive metabolic panel    Lipid panel    Referral to Physical Therapy    Uterine fibroid D25.9    Relevant Orders    CT chest w IV contrast    CBC and Auto Differential    Comprehensive metabolic panel    Moderate protein-calorie malnutrition (CMS/HCC) E44.0    Relevant Orders    Referral to Nutrition Services    Depression F32.A     Cymbalta has been held due to alcohol abuse and use. Trial of nortriptyline.  Will see if this helps with depression and with neuropathy symptoms         Relevant Orders    CBC and Auto Differential    Comprehensive metabolic panel    Lipid panel    Paresthesias R20.2    Relevant Orders    Referral to Nutrition Services    Referral to Physical Therapy    Sensory ganglionopathy G54.9    Relevant Orders    CT  chest w IV contrast    CBC and Auto Differential    Comprehensive metabolic panel    Vitamin B12    Albumin, urine, random    Lipid panel    Referral to Physical Therapy    Voltage-gated potassium channel (VGKC) antibody positive - Primary R76.8    Relevant Orders    CT chest w IV contrast    CBC and Auto Differential    Comprehensive metabolic panel     Other Visit Diagnoses         Codes    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

## 2023-12-13 NOTE — ASSESSMENT & PLAN NOTE
Cymbalta has been held due to alcohol abuse and use. Trial of nortriptyline.  Will see if this helps with depression and with neuropathy symptoms

## 2023-12-20 ENCOUNTER — APPOINTMENT (OUTPATIENT)
Dept: OBSTETRICS AND GYNECOLOGY | Facility: CLINIC | Age: 48
End: 2023-12-20
Payer: COMMERCIAL

## 2023-12-20 DIAGNOSIS — K76.0 HEPATIC STEATOSIS: ICD-10-CM

## 2023-12-20 DIAGNOSIS — F10.220 ALCOHOL DEPENDENCE WITH UNCOMPLICATED INTOXICATION (MULTI): ICD-10-CM

## 2023-12-20 DIAGNOSIS — R27.0 ATAXIA: ICD-10-CM

## 2023-12-21 RX ORDER — LANOLIN ALCOHOL/MO/W.PET/CERES
1000 CREAM (GRAM) TOPICAL DAILY
Qty: 30 TABLET | Refills: 3 | Status: SHIPPED | OUTPATIENT
Start: 2023-12-21 | End: 2024-04-15

## 2024-01-19 ENCOUNTER — APPOINTMENT (OUTPATIENT)
Dept: NEUROLOGY | Facility: CLINIC | Age: 49
End: 2024-01-19
Payer: COMMERCIAL

## 2024-01-19 DIAGNOSIS — R20.0 NUMBNESS AND TINGLING IN BOTH HANDS: ICD-10-CM

## 2024-01-19 DIAGNOSIS — M79.2 PERIPHERAL NEUROPATHIC PAIN: ICD-10-CM

## 2024-01-19 DIAGNOSIS — R20.2 NUMBNESS AND TINGLING IN BOTH HANDS: ICD-10-CM

## 2024-01-19 DIAGNOSIS — F10.220 ALCOHOL DEPENDENCE WITH UNCOMPLICATED INTOXICATION (MULTI): ICD-10-CM

## 2024-01-19 RX ORDER — GABAPENTIN 100 MG/1
CAPSULE ORAL
Qty: 180 CAPSULE | Refills: 1 | Status: SHIPPED | OUTPATIENT
Start: 2024-01-19 | End: 2024-02-19

## 2024-01-19 NOTE — TELEPHONE ENCOUNTER
Pt is requesting that this be marked urgent as patient does not have enough medication to last the weekend.

## 2024-02-07 ENCOUNTER — TELEPHONE (OUTPATIENT)
Dept: PRIMARY CARE | Facility: CLINIC | Age: 49
End: 2024-02-07
Payer: COMMERCIAL

## 2024-02-07 DIAGNOSIS — H10.30 ACUTE BACTERIAL CONJUNCTIVITIS, UNSPECIFIED LATERALITY: Primary | ICD-10-CM

## 2024-02-07 RX ORDER — TOBRAMYCIN AND DEXAMETHASONE 3; 1 MG/ML; MG/ML
1 SUSPENSION/ DROPS OPHTHALMIC
Qty: 10 ML | Refills: 0 | Status: SHIPPED | OUTPATIENT
Start: 2024-02-07 | End: 2024-02-17

## 2024-02-07 NOTE — TELEPHONE ENCOUNTER
Pt was just around a niece a few days ago that tested positive for pink eye. Pt woke up today and is sure that she has pink eye. Pt's eye is draining, red in and outside the eye, itchy, puffy. Pt is requesting an rx to help her to [facility] in [place].     Pt was also around a nephew that has tested positive for the flu. Pt now has a sore throat and runny nose. Pt denies any other sx. Please advise.

## 2024-02-18 DIAGNOSIS — M79.2 PERIPHERAL NEUROPATHIC PAIN: ICD-10-CM

## 2024-02-18 DIAGNOSIS — R20.0 NUMBNESS AND TINGLING IN BOTH HANDS: ICD-10-CM

## 2024-02-18 DIAGNOSIS — R20.2 NUMBNESS AND TINGLING IN BOTH HANDS: ICD-10-CM

## 2024-02-18 DIAGNOSIS — I10 PRIMARY HYPERTENSION: ICD-10-CM

## 2024-02-18 DIAGNOSIS — F10.220 ALCOHOL DEPENDENCE WITH UNCOMPLICATED INTOXICATION (MULTI): ICD-10-CM

## 2024-02-19 RX ORDER — CLONIDINE HYDROCHLORIDE 0.1 MG/1
TABLET ORAL
Qty: 180 TABLET | Refills: 2 | Status: SHIPPED | OUTPATIENT
Start: 2024-02-19 | End: 2024-03-13 | Stop reason: SDUPTHER

## 2024-02-19 RX ORDER — GABAPENTIN 100 MG/1
CAPSULE ORAL
Qty: 180 CAPSULE | Refills: 1 | Status: SHIPPED | OUTPATIENT
Start: 2024-02-19 | End: 2024-05-14

## 2024-03-13 ENCOUNTER — OFFICE VISIT (OUTPATIENT)
Dept: PRIMARY CARE | Facility: CLINIC | Age: 49
End: 2024-03-13
Payer: COMMERCIAL

## 2024-03-13 VITALS
SYSTOLIC BLOOD PRESSURE: 124 MMHG | HEIGHT: 65 IN | RESPIRATION RATE: 16 BRPM | TEMPERATURE: 97.8 F | DIASTOLIC BLOOD PRESSURE: 81 MMHG | WEIGHT: 123 LBS | BODY MASS INDEX: 20.49 KG/M2 | OXYGEN SATURATION: 96 % | HEART RATE: 95 BPM

## 2024-03-13 DIAGNOSIS — F12.90 MARIJUANA USE: ICD-10-CM

## 2024-03-13 DIAGNOSIS — M79.2 PERIPHERAL NEUROPATHIC PAIN: ICD-10-CM

## 2024-03-13 DIAGNOSIS — G98.8 PARANEOPLASTIC NEUROLOGIC DISORDER (MULTI): ICD-10-CM

## 2024-03-13 DIAGNOSIS — R20.2 NUMBNESS AND TINGLING IN BOTH HANDS: ICD-10-CM

## 2024-03-13 DIAGNOSIS — R20.0 NUMBNESS AND TINGLING IN BOTH HANDS: ICD-10-CM

## 2024-03-13 DIAGNOSIS — F10.220 ALCOHOL DEPENDENCE WITH UNCOMPLICATED INTOXICATION (MULTI): ICD-10-CM

## 2024-03-13 DIAGNOSIS — C80.1 PARANEOPLASTIC NEUROLOGIC DISORDER (MULTI): ICD-10-CM

## 2024-03-13 DIAGNOSIS — F17.200 TOBACCO USE DISORDER: ICD-10-CM

## 2024-03-13 DIAGNOSIS — R56.9 SEIZURE (MULTI): ICD-10-CM

## 2024-03-13 DIAGNOSIS — I10 PRIMARY HYPERTENSION: ICD-10-CM

## 2024-03-13 DIAGNOSIS — R53.83 TIREDNESS: ICD-10-CM

## 2024-03-13 DIAGNOSIS — R06.02 SHORTNESS OF BREATH: ICD-10-CM

## 2024-03-13 DIAGNOSIS — R11.2 NAUSEA AND VOMITING, UNSPECIFIED VOMITING TYPE: Primary | ICD-10-CM

## 2024-03-13 DIAGNOSIS — F12.20 MARIJUANA DEPENDENCE (MULTI): ICD-10-CM

## 2024-03-13 DIAGNOSIS — G62.1 ALCOHOL-INDUCED POLYNEUROPATHY (MULTI): ICD-10-CM

## 2024-03-13 PROCEDURE — 4004F PT TOBACCO SCREEN RCVD TLK: CPT | Performed by: INTERNAL MEDICINE

## 2024-03-13 PROCEDURE — 3079F DIAST BP 80-89 MM HG: CPT | Performed by: INTERNAL MEDICINE

## 2024-03-13 PROCEDURE — 99214 OFFICE O/P EST MOD 30 MIN: CPT | Performed by: INTERNAL MEDICINE

## 2024-03-13 PROCEDURE — 3074F SYST BP LT 130 MM HG: CPT | Performed by: INTERNAL MEDICINE

## 2024-03-13 RX ORDER — ONDANSETRON 8 MG/1
8 TABLET, ORALLY DISINTEGRATING ORAL EVERY 8 HOURS PRN
Qty: 20 TABLET | Refills: 1 | Status: SHIPPED | OUTPATIENT
Start: 2024-03-13

## 2024-03-13 RX ORDER — CLONIDINE HYDROCHLORIDE 0.1 MG/1
TABLET ORAL
Qty: 180 TABLET | Refills: 1 | Status: SHIPPED | OUTPATIENT
Start: 2024-03-13 | End: 2024-06-07 | Stop reason: SDUPTHER

## 2024-03-13 RX ORDER — UMECLIDINIUM 62.5 UG/1
1 AEROSOL, POWDER ORAL DAILY
Qty: 30 EACH | Refills: 6 | Status: SHIPPED | OUTPATIENT
Start: 2024-03-13 | End: 2024-03-18 | Stop reason: RX

## 2024-03-13 SDOH — ECONOMIC STABILITY: FOOD INSECURITY: WITHIN THE PAST 12 MONTHS, THE FOOD YOU BOUGHT JUST DIDN'T LAST AND YOU DIDN'T HAVE MONEY TO GET MORE.: NEVER TRUE

## 2024-03-13 SDOH — ECONOMIC STABILITY: FOOD INSECURITY: WITHIN THE PAST 12 MONTHS, YOU WORRIED THAT YOUR FOOD WOULD RUN OUT BEFORE YOU GOT MONEY TO BUY MORE.: NEVER TRUE

## 2024-03-13 ASSESSMENT — PATIENT HEALTH QUESTIONNAIRE - PHQ9
8. MOVING OR SPEAKING SO SLOWLY THAT OTHER PEOPLE COULD HAVE NOTICED. OR THE OPPOSITE, BEING SO FIGETY OR RESTLESS THAT YOU HAVE BEEN MOVING AROUND A LOT MORE THAN USUAL: MORE THAN HALF THE DAYS
9. THOUGHTS THAT YOU WOULD BE BETTER OFF DEAD, OR OF HURTING YOURSELF: NOT AT ALL
2. FEELING DOWN, DEPRESSED OR HOPELESS: NEARLY EVERY DAY
SUM OF ALL RESPONSES TO PHQ9 QUESTIONS 1 & 2: 6
SUM OF ALL RESPONSES TO PHQ QUESTIONS 1-9: 20
7. TROUBLE CONCENTRATING ON THINGS, SUCH AS READING THE NEWSPAPER OR WATCHING TELEVISION: MORE THAN HALF THE DAYS
3. TROUBLE FALLING OR STAYING ASLEEP: NEARLY EVERY DAY
4. FEELING TIRED OR HAVING LITTLE ENERGY: NEARLY EVERY DAY
6. FEELING BAD ABOUT YOURSELF - OR THAT YOU ARE A FAILURE OR HAVE LET YOURSELF OR YOUR FAMILY DOWN: SEVERAL DAYS
1. LITTLE INTEREST OR PLEASURE IN DOING THINGS: NEARLY EVERY DAY
5. POOR APPETITE OR OVEREATING: NEARLY EVERY DAY
10. IF YOU CHECKED OFF ANY PROBLEMS, HOW DIFFICULT HAVE THESE PROBLEMS MADE IT FOR YOU TO DO YOUR WORK, TAKE CARE OF THINGS AT HOME, OR GET ALONG WITH OTHER PEOPLE: EXTREMELY DIFFICULT

## 2024-03-13 ASSESSMENT — LIFESTYLE VARIABLES
HOW MANY STANDARD DRINKS CONTAINING ALCOHOL DO YOU HAVE ON A TYPICAL DAY: 1 OR 2
HOW OFTEN DO YOU HAVE A DRINK CONTAINING ALCOHOL: 2-3 TIMES A WEEK
HOW OFTEN DO YOU HAVE SIX OR MORE DRINKS ON ONE OCCASION: NEVER
AUDIT-C TOTAL SCORE: 3
SKIP TO QUESTIONS 9-10: 1

## 2024-03-13 NOTE — ASSESSMENT & PLAN NOTE
Will follow up with neurology, anticipate that the patient will have a CT chest completed, after following up with Dr DaS ilva

## 2024-03-13 NOTE — ASSESSMENT & PLAN NOTE
Patient continues to have polyneuropathy, she still drinks alcohol, she has decreased consumption. Continue use of gabapentin as ordered

## 2024-03-13 NOTE — PROGRESS NOTES
Subjective   Patient ID: Maru Huynh is a 48 y.o. female who presents for Follow-up (Pt is tired/exhausted all the time. Would like to maybe up the b12 or talk about the b12 shots.//Would like to discuss upping the gabapentin as well).    HPI     Follow-up visit.    Alcohol-induced Polyneuropathy: She is currently on Gabapentin 200 mg TID. She is requesting prescription dosage increase. She was started on nortriptyline also.  She is also taking B12, Folate, and Thiamine. Alk Phos 110, AST 57, ALT 24. She is currently in the 12-step program. She endorses of ongoing EtOH-usage, described as 2-3 mixed drinks containing with about 2 shots of vodka or whiskey. Patient was positive for a paraneoplastic autoantibody. CT Chest was ordered but denied by insurance. Patient was referred by a [place] specialist and is scheduled to establish care. Will see Dr Da Silva for evaluation in 5/2024, she will apparently have the CT completed after appointment with Dr Da Silva. Patient also uses marijuana primarily to help the patient sleep at night.     Sinus pressure: Patient reports of chronic bilateral sinus pressure. She has tried OTC nasal sprays w/ some relief. Patient reports of a prior ENT-specialist and was recommended surgical interventions. She endorses 1 PPD smoking.    Uterine Fibroids: Patient was referred to a gyn provider and missed her scheduled appointment. Patient reports to be re-scheduling soon.    Depression/Anxiety: She is no longer on Cymbalta due to alcohol-use. She has been taking walks and working with animals to manage her psychiatric symptoms.    HTN: Currently on amlodipine 10 mg and clonidine 0.1 mg. BP in-office is 124/81.    Daytime sleepiness: Patient reports that she feels tired throughout the day and requires naps frequently. She does not have a CPAP. No lifetime sleep study evaluation. In addition, she is no longer on daily inhaler therapy for COPD.    Smoker:  patient has smoked for at least 20 years. She  "is tired, she does sleep at night, she also smokes marijuana , she will try to avoid inhaled marijuana she states.     Review of Systems  All pertinent positive symptoms are included in the history of present illness.  All other systems have been reviewed and are negative and noncontributory to this patient's current ailments.     Objective   /81 (BP Location: Left arm, Patient Position: Sitting, BP Cuff Size: Adult)   Pulse 95   Temp 36.6 °C (97.8 °F)   Resp 16   Ht 1.651 m (5' 5\")   Wt 55.8 kg (123 lb)   SpO2 96%   BMI 20.47 kg/m²     Physical Exam  CONSTITUTIONAL - well nourished, well developed, looks like stated age, in no acute distress, not ill-appearing  SKIN - normal skin color and pigmentation, normal skin turgor without rash, lesions, or nodules visualized on exposed skin  HEAD - no trauma, normocephalic, patient is edentulous   EYES - pupils are equal and reactive to light, extraocular muscles are intact, and normal external exam  CHEST - decreased lung sounds to upper lung fields, no wheezing, no crackles and no rales, good effort  CARDIAC - regular rate and regular rhythm, no skipped beats, no murmur  ABDOMEN - no organomegaly, soft, nontender, nondistended, normal bowel sounds, no guarding/rebound/rigidity  EXTREMITIES - no edema, no deformities  NEUROLOGICAL -stocking glove sensory loss, the sensory loss is distal to med thighs bilaterally   PSYCHIATRIC - alert, pleasant and cordial, age-appropriate  IMMUNOLOGIC - no cervical lymphadenopathy     Assessment/Plan   Problem List Items Addressed This Visit       Alcohol dependence (CMS/HCC)    Relevant Orders    CBC and Auto Differential    Comprehensive Metabolic Panel    Marijuana dependence (CMS/HCC)     Avoid use of marijuana by smoking or inhaling, the patient likely has symptoms of COPD already due to long standing history of tobacco smoking         Relevant Orders    CBC and Auto Differential    Comprehensive Metabolic Panel    " Numbness and tingling in both hands    Relevant Orders    CBC and Auto Differential    Comprehensive Metabolic Panel    Vomiting - Primary    Relevant Medications    ondansetron ODT (Zofran-ODT) 8 mg disintegrating tablet    Other Relevant Orders    CBC and Auto Differential    Comprehensive Metabolic Panel    Alcohol-induced polyneuropathy (CMS/HCC)     Patient continues to have polyneuropathy, she still drinks alcohol, she has decreased consumption. Continue use of gabapentin as ordered         Relevant Orders    CBC and Auto Differential    Comprehensive Metabolic Panel    Lipid Panel    Seizure (CMS/HCC)    Relevant Orders    CBC and Auto Differential    Albumin , Urine Random    Comprehensive Metabolic Panel    Marijuana use    Relevant Orders    CBC and Auto Differential    Comprehensive Metabolic Panel    Tobacco use disorder     Encourage smoking cessation, concern that the patient has COPD also         Relevant Medications    umeclidinium (Incruse Ellipta) 62.5 mcg/actuation inhalation    Other Relevant Orders    CBC and Auto Differential    Comprehensive Metabolic Panel    Complete Pulmonary Function Test Pre/Post Bronchodialator (Spirometry Pre/Post/DLCO/Lung Volumes)    Paraneoplastic neurologic disorder (CMS/HCC)     Will follow up with neurology, anticipate that the patient will have a CT chest completed, after following up with Dr Da Silva          Relevant Orders    CBC and Auto Differential    Comprehensive Metabolic Panel     Other Visit Diagnoses       Peripheral neuropathic pain        Relevant Orders    CBC and Auto Differential    Comprehensive Metabolic Panel    Primary hypertension        Relevant Medications    cloNIDine (Catapres) 0.1 mg tablet    Other Relevant Orders    CBC and Auto Differential    Albumin , Urine Random    Comprehensive Metabolic Panel    Tiredness        Relevant Medications    umeclidinium (Incruse Ellipta) 62.5 mcg/actuation inhalation    Other Relevant Orders    TSH  with reflex to Free T4 if abnormal    Complete Pulmonary Function Test Pre/Post Bronchodialator (Spirometry Pre/Post/DLCO/Lung Volumes)    Shortness of breath        Relevant Medications    umeclidinium (Incruse Ellipta) 62.5 mcg/actuation inhalation    Other Relevant Orders    Complete Pulmonary Function Test Pre/Post Bronchodialator (Spirometry Pre/Post/DLCO/Lung Volumes)          Check labs as ordered, check PFTs, follow up with neurology as ordered already

## 2024-03-13 NOTE — ASSESSMENT & PLAN NOTE
Avoid use of marijuana by smoking or inhaling, the patient likely has symptoms of COPD already due to long standing history of tobacco smoking

## 2024-03-18 ENCOUNTER — TELEPHONE (OUTPATIENT)
Dept: PRIMARY CARE | Facility: CLINIC | Age: 49
End: 2024-03-18
Payer: COMMERCIAL

## 2024-03-18 DIAGNOSIS — C80.1 PARANEOPLASTIC NEUROLOGIC DISORDER (MULTI): ICD-10-CM

## 2024-03-18 DIAGNOSIS — G98.8 PARANEOPLASTIC NEUROLOGIC DISORDER (MULTI): ICD-10-CM

## 2024-03-18 DIAGNOSIS — G62.1 ALCOHOL-INDUCED POLYNEUROPATHY (MULTI): ICD-10-CM

## 2024-03-18 DIAGNOSIS — F17.200 TOBACCO USE DISORDER: Primary | ICD-10-CM

## 2024-03-18 RX ORDER — TIOTROPIUM BROMIDE INHALATION SPRAY 3.12 UG/1
2 SPRAY, METERED RESPIRATORY (INHALATION) DAILY
Qty: 8 G | Refills: 11 | Status: SHIPPED | OUTPATIENT
Start: 2024-03-18 | End: 2024-04-09

## 2024-03-18 NOTE — TELEPHONE ENCOUNTER
Talked with pharmacy ([facility]) and Umeclidinium is on back order.  Is it possible to have something else called in.  Also was told to check b/p and her machine broke.  Can she have a prescription for a new one.

## 2024-03-19 RX ORDER — ACETAMINOPHEN 500 MG
1 TABLET ORAL DAILY
Qty: 1 KIT | Refills: 0 | Status: SHIPPED | OUTPATIENT
Start: 2024-03-19

## 2024-03-19 NOTE — TELEPHONE ENCOUNTER
Thank you for handling the first part of the message, is it possible to write a script for a bp machine for her?

## 2024-04-08 ENCOUNTER — TELEPHONE (OUTPATIENT)
Dept: PRIMARY CARE | Facility: CLINIC | Age: 49
End: 2024-04-08
Payer: COMMERCIAL

## 2024-04-08 DIAGNOSIS — F17.200 TOBACCO USE DISORDER: Primary | ICD-10-CM

## 2024-04-08 DIAGNOSIS — T78.40XD ALLERGY, SUBSEQUENT ENCOUNTER: ICD-10-CM

## 2024-04-08 NOTE — TELEPHONE ENCOUNTER
Patient called in stating that the second inhaler that was sent in was also on back order can you send in something else again.    Please Advise    [facility]

## 2024-04-09 RX ORDER — TIOTROPIUM BROMIDE 18 UG/1
1 CAPSULE ORAL; RESPIRATORY (INHALATION)
Qty: 30 CAPSULE | Refills: 5 | Status: SHIPPED | OUTPATIENT
Start: 2024-04-09 | End: 2024-06-07 | Stop reason: SDUPTHER

## 2024-04-09 NOTE — TELEPHONE ENCOUNTER
Talked with pt and she said it was the Spiriva.  She talked with the pharmacist and they said that the Spiriva you order was a newer version.  They do have the older version but would need a new script for that.  The Incruse Elllipta (first inhaler that was ordered) was canceled due to pharmacy not having that either.  Pt said if our pharmacist that is on site can help she would be much obliged.  Please advise.

## 2024-04-14 DIAGNOSIS — R27.0 ATAXIA: ICD-10-CM

## 2024-04-14 DIAGNOSIS — K76.0 HEPATIC STEATOSIS: ICD-10-CM

## 2024-04-14 DIAGNOSIS — F10.220 ALCOHOL DEPENDENCE WITH UNCOMPLICATED INTOXICATION (MULTI): ICD-10-CM

## 2024-04-15 RX ORDER — THIAMINE HCL 100 MG
100 TABLET ORAL DAILY
Qty: 30 TABLET | Refills: 11 | Status: SHIPPED | OUTPATIENT
Start: 2024-04-15 | End: 2024-06-07 | Stop reason: SDUPTHER

## 2024-04-15 RX ORDER — LANOLIN ALCOHOL/MO/W.PET/CERES
1000 CREAM (GRAM) TOPICAL DAILY
Qty: 30 TABLET | Refills: 3 | Status: SHIPPED | OUTPATIENT
Start: 2024-04-15 | End: 2024-06-07 | Stop reason: SDUPTHER

## 2024-04-15 RX ORDER — FOLIC ACID 1 MG/1
1 TABLET ORAL DAILY
Qty: 30 TABLET | Refills: 11 | Status: SHIPPED | OUTPATIENT
Start: 2024-04-15 | End: 2024-06-07 | Stop reason: SDUPTHER

## 2024-04-17 ENCOUNTER — OFFICE VISIT (OUTPATIENT)
Dept: OBSTETRICS AND GYNECOLOGY | Facility: CLINIC | Age: 49
End: 2024-04-17
Payer: COMMERCIAL

## 2024-04-17 VITALS — BODY MASS INDEX: 20.8 KG/M2 | DIASTOLIC BLOOD PRESSURE: 78 MMHG | WEIGHT: 125 LBS | SYSTOLIC BLOOD PRESSURE: 112 MMHG

## 2024-04-17 DIAGNOSIS — Z59.41 FOOD INSECURITY: ICD-10-CM

## 2024-04-17 DIAGNOSIS — Z01.419 WELL WOMAN EXAM: Primary | ICD-10-CM

## 2024-04-17 DIAGNOSIS — N95.1 MENOPAUSAL SYMPTOMS: ICD-10-CM

## 2024-04-17 DIAGNOSIS — Z12.31 SCREENING MAMMOGRAM FOR BREAST CANCER: ICD-10-CM

## 2024-04-17 PROCEDURE — 99396 PREV VISIT EST AGE 40-64: CPT | Performed by: OBSTETRICS & GYNECOLOGY

## 2024-04-17 RX ORDER — VENLAFAXINE HYDROCHLORIDE 37.5 MG/1
37.5 CAPSULE, EXTENDED RELEASE ORAL DAILY
Qty: 7 CAPSULE | Refills: 0 | Status: SHIPPED | OUTPATIENT
Start: 2024-04-17 | End: 2024-06-07 | Stop reason: SDUPTHER

## 2024-04-17 RX ORDER — VENLAFAXINE HYDROCHLORIDE 75 MG/1
75 CAPSULE, EXTENDED RELEASE ORAL DAILY
Qty: 30 CAPSULE | Refills: 11 | Status: SHIPPED | OUTPATIENT
Start: 2024-04-17 | End: 2024-06-07 | Stop reason: SDUPTHER

## 2024-04-17 SDOH — ECONOMIC STABILITY: FOOD INSECURITY: WITHIN THE PAST 12 MONTHS, THE FOOD YOU BOUGHT JUST DIDN'T LAST AND YOU DIDN'T HAVE MONEY TO GET MORE.: OFTEN TRUE

## 2024-04-17 SDOH — ECONOMIC STABILITY - FOOD INSECURITY: FOOD INSECURITY: Z59.41

## 2024-04-17 SDOH — ECONOMIC STABILITY: FOOD INSECURITY: WITHIN THE PAST 12 MONTHS, YOU WORRIED THAT YOUR FOOD WOULD RUN OUT BEFORE YOU GOT MONEY TO BUY MORE.: OFTEN TRUE

## 2024-04-17 NOTE — ASSESSMENT & PLAN NOTE
--ANNUAL EXAM: Patient with complaint of menopausal symptoms, otherwise doing well.  Denies any menses.  Normal exam.  Patient is being seen by neurology for peripheral neuropathy.  Will start on Effexor, perform routine mammogram, and follow-up in 1 year  --FIBROIDS: Ultrasound on 2/28/2023 with uterus 8.6 x 8.8 x 7.9 cm, fibroid 6.4 x 6.9 x 6.2 cm, uterus about 8 weeks size, recommended continued observation  --MENOPAUSE: FSH 57, LH 49 on 3/2/2023.  Patient with no menses, complaint of hot flashes, night sweats, moodiness, and trouble sleeping.  Discussed with patient options of observation, OTC medications, SSRIs, or HRT.  Discussed risk and benefits of all.  Patient desires to try Effexor, will start on 37.5 mg for 1 week then increase to 75 mg daily.  Patient will notify me how she tolerates.  --CONTRACEPTION: Status post TL  --S/p endometrial ablation in 2018  --Normal Pap and HPV in 2018 and 2023  --normal mammogram March 2023, will repeat  --Patient is P3    PLAN:  Mammogram  Effexor 37.5 mg daily for 1 week then 75 mg daily  Notify me with any problems with the Effexor  Otherwise follow-up in 1 year

## 2024-04-17 NOTE — PROGRESS NOTES
Subjective   Patient ID: Maru Huynh is a 48 y.o. female who presents for Annual .  HPI  48-year-old here for her annual physical exam.  Patient continues with no menses, she complains of hot flashes night sweats, moodiness, and trouble sleeping.  Patient also being seen by her PMD and neurology for a peripheral neuropathy.        Objective   Physical Exam  Exam conducted with a chaperone present.   Constitutional:       Appearance: Normal appearance.   Cardiovascular:      Rate and Rhythm: Normal rate and regular rhythm.   Pulmonary:      Effort: Pulmonary effort is normal.      Breath sounds: Normal breath sounds.   Chest:   Breasts:     Right: Normal. No mass or tenderness.      Left: Normal. No mass or tenderness.   Abdominal:      Palpations: Abdomen is soft. There is no mass.      Tenderness: There is no abdominal tenderness.   Genitourinary:     General: Normal vulva.      Vagina: Normal. No lesions.      Cervix: No lesion.      Uterus: Normal. Not enlarged and not tender.       Adnexa: Right adnexa normal and left adnexa normal.        Right: No mass or tenderness.          Left: No mass or tenderness.     Musculoskeletal:      Cervical back: Neck supple.   Skin:     General: Skin is warm and dry.   Neurological:      Mental Status: She is alert and oriented to person, place, and time.   Psychiatric:         Mood and Affect: Mood normal.         Behavior: Behavior normal.         Assessment/Plan   Problem List Items Addressed This Visit             ICD-10-CM    Well woman exam - Primary Z01.419     --ANNUAL EXAM: Patient with complaint of menopausal symptoms, otherwise doing well.  Denies any menses.  Normal exam.  Patient is being seen by neurology for peripheral neuropathy.  Will start on Effexor, perform routine mammogram, and follow-up in 1 year  --FIBROIDS: Ultrasound on 2/28/2023 with uterus 8.6 x 8.8 x 7.9 cm, fibroid 6.4 x 6.9 x 6.2 cm, uterus about 8 weeks size, recommended continued  observation  --MENOPAUSE: FSH 57, LH 49 on 3/2/2023.  Patient with no menses, complaint of hot flashes, night sweats, moodiness, and trouble sleeping.  Discussed with patient options of observation, OTC medications, SSRIs, or HRT.  Discussed risk and benefits of all.  Patient desires to try Effexor, will start on 37.5 mg for 1 week then increase to 75 mg daily.  Patient will notify me how she tolerates.  --CONTRACEPTION: Status post TL  --S/p endometrial ablation in 2018  --Normal Pap and HPV in 2018 and 2023  --normal mammogram March 2023, will repeat  --Patient is P3    PLAN:  Mammogram  Effexor 37.5 mg daily for 1 week then 75 mg daily  Notify me with any problems with the Effexor  Otherwise follow-up in 1 year         Menopausal symptoms N95.1    Relevant Medications    venlafaxine XR (Effexor-XR) 37.5 mg 24 hr capsule    venlafaxine XR (Effexor-XR) 75 mg 24 hr capsule     Other Visit Diagnoses         Codes    Screening mammogram for breast cancer     Z12.31    Relevant Orders    BI mammo bilateral screening tomosynthesis    Food insecurity     Z59.41    Relevant Orders    Referral to [facility]

## 2024-05-06 NOTE — PROGRESS NOTES
[facility] CARD[facility]OLOG[facility], [address], [address]  [place] [address]  PHONE: [phone]    SUBJECTIVE:   Marissa Macias is a 57 y.o. female 1966   seen for a follow up visit regarding the following:     Chief Complaint   Patient presents with    6 Month Follow-Up    Hypertension     History of present illness: 57 y.o. female presented for follow-up 5/6/24  weight loss.  Recent emergency department visit with elevated troponin recurrent chest discomfort.  Her last cath was 10 years ago.  She has had a history of abnormal stress perfusion studies.    Interval Hx:    She was seen in consultation 02/14/2018. The patient has a previous history of abnormal stress test and underwent cardiac catheterization with angiographically normal coronary arteries.  Additionally, echocardiogram in 2017 with normal left ventricular  systolic function and dilated right ventricular chambers.        Presented 02/26/21 she had recent severe chest pain after eating lasing for several hours.     Cardiac History:     Stress perfusion study in 2014 abnormal with apical ischemia.      Cardiac catheterization in 2014 - angiographically normal coronary arteries.     2019 Aflutter cardioversion 2019 2019 on amiodarone amiodarone started by EP cardioversion      2021 stress perfusion study [hospital performed].  Fixed defects no ischemia noted.  Difficult study due to body habitus and inability to gait imaging correctly  Abnormal triglycerides in the past potential allergy  to vascepa       Assessment and Plan:   Class III angina   Risks benefits and alternative therapies of cardiac catheterization were discussed.  Risks include bleeding, myocardial infarction, stroke.  Following discussion of these risks patient agrees to proceed with the procedure.  Atrial fibrillation/atrial flutter  Discussed long-term potential toxicities of amiodarone.    Dofetilide may be an alternative option.  Case has been discussed extensively  Chief Complaint/HPI:  Hospital follow up ,  14 day TCM    Patient was hospitalized after beginning to experience  symptoms of acute alcohol withdrawal, she was hospitalized for treatment, she was discharged to follow up at [place] [id number] for intensive outpatient therapy, the patient still has pain and numbness in the hands worse than the feet. The neuropathy seems to be diffuse now. It seems to be moving closer to the pelvis region. She is not able to use a knife due  to numbness in the hands. Patient is still smoking, she would like to get an order for nicotine patch. Patient smokes about 1/2 ppd. She is now also taking B12, folate, and thiamine. She has not had any alcohol for 20 days.    ROS otherwise negative aside from what was mentioned above in HPI. Numbness and pain in the hands and feet. Difficulty using utensils      Patient Active Problem List   Diagnosis    Tobacco use    Alcohol dependence (CMS/HCC)    Marijuana dependence (CMS/HCC)    Generalized abdominal pain    Numbness and tingling in both hands    Ataxia    Uterine fibroid    Weight loss, unintentional    Hepatic steatosis    Moderate protein-calorie malnutrition (CMS/HCC)    Vomiting    Alcohol withdrawal syndrome (CMS/HCC)    Alcoholic ketoacidosis         No past medical history on file.  Past Surgical History:   Procedure Laterality Date    TONSILLECTOMY Bilateral     TUBAL LIGATION       Social History     Social History Narrative    Not on file         ALLERGIES  Penicillins      MEDICATIONS  Current Outpatient Medications on File Prior to Visit   Medication Sig Dispense Refill    amLODIPine (Norvasc) 10 mg tablet       cloNIDine (Catapres) 0.1 mg tablet       cyanocobalamin (Vitamin B-12) 1,000 mcg tablet Take 1 tablet (1,000 mcg) by mouth once daily. 30 tablet 3    folic acid (Folvite) 1 mg tablet Take 1 tablet (1 mg) by mouth once daily. 30 tablet 11    thiamine (Vitamin B-1) 100 mg tablet Take 1 tablet (100 mg) by mouth once daily. 30 tablet  "11    omeprazole (PriLOSEC) 40 mg DR capsule Take 1 capsule (40 mg) by mouth once daily.      ondansetron ODT (Zofran-ODT) 8 mg disintegrating tablet Take 1 tablet (8 mg) by mouth every 8 hours if needed for vomiting.       Current Facility-Administered Medications on File Prior to Visit   Medication Dose Route Frequency Provider Last Rate Last Admin    pyridoxine (Vitamin B-6) tablet 25 mg  25 mg oral Once Javier Roberts MD             PHYSICAL EXAM  /79   Pulse 109   Resp 16   Ht 1.638 m (5' 4.5\")   Wt 55.8 kg (123 lb)   SpO2 96%   BMI 20.79 kg/m²   Body mass index is 20.79 kg/m².  Gen: Alert, NAD, patient is thin, she is alert and oriented x 3 today, she is somewhat tearful due to numbness issues.   HEENT:  PERRLA, EOMI, conjunctiva and sclera normal in appearance, very slight ptosis of the right eyelid  Respiratory:  Lungs CTAB, slightly diminished breath sounds throughout  Cardiovascular:  Heart: rapid rate, regular rhythm. No M/R/G  Neuro:  numbness and tenderness of the hands is noted, Reflexes: patellar reflexes are only 1+ today   Skin:  No suspicious lesions present on exposed skin    ASSESSMENT/PLAN  Problem List Items Addressed This Visit          Nervous    Numbness and tingling in both hands    Current Assessment & Plan     B12, folate levels are in normal range, patient is taking both vitamins now, she has quit alcohol use which is likely the best therapy. Will check NCT/ EMG of the upper extremities. Check a TSH, recheck liver enzymes now. Trial of duloxetine 30 mg daily for 1 week then increase to 60 mg daily, recheck the patient in about 4 weeks           Relevant Medications    DULoxetine (Cymbalta) 30 mg DR capsule    Other Relevant Orders    TSH with reflex to Free T4 if abnormal    CBC and Auto Differential    EMG & nerve conduction       Digestive    Hepatic steatosis - Primary    Relevant Orders    Hepatic function panel    CBC and Auto Differential       " Endocrine/Metabolic    Moderate protein-calorie malnutrition (CMS/HCC)    Relevant Orders    Hepatic function panel    CBC and Auto Differential       Other    Tobacco use    Current Assessment & Plan     Will start nicotine patch 21 mg daily, quit smoking altogether         Relevant Orders    CBC and Auto Differential    Alcohol dependence (CMS/HCC)    Current Assessment & Plan     She has not been drinking for 20 days, continue to encourage cessation, go to [facility]         Relevant Orders    CBC and Auto Differential    Ataxia    Relevant Orders    CBC and Auto Differential    Alcohol withdrawal syndrome (CMS/HCC)    Relevant Orders    Basic metabolic panel    CBC and Auto Differential     Other Visit Diagnoses       Depressive disorder        Relevant Medications    DULoxetine (Cymbalta) 30 mg DR capsule          Follow up in 4 weeks    Javier Roberts MD    Patient/Caregiver provided printed discharge information.

## 2024-05-14 DIAGNOSIS — R20.2 NUMBNESS AND TINGLING IN BOTH HANDS: ICD-10-CM

## 2024-05-14 DIAGNOSIS — F10.220 ALCOHOL DEPENDENCE WITH UNCOMPLICATED INTOXICATION (MULTI): ICD-10-CM

## 2024-05-14 DIAGNOSIS — M79.2 PERIPHERAL NEUROPATHIC PAIN: ICD-10-CM

## 2024-05-14 DIAGNOSIS — R20.0 NUMBNESS AND TINGLING IN BOTH HANDS: ICD-10-CM

## 2024-05-14 RX ORDER — GABAPENTIN 100 MG/1
CAPSULE ORAL
Qty: 180 CAPSULE | Refills: 1 | Status: SHIPPED | OUTPATIENT
Start: 2024-05-14

## 2024-05-31 ENCOUNTER — OFFICE VISIT (OUTPATIENT)
Dept: NEUROLOGY | Facility: CLINIC | Age: 49
End: 2024-05-31
Payer: COMMERCIAL

## 2024-05-31 VITALS
SYSTOLIC BLOOD PRESSURE: 130 MMHG | HEART RATE: 108 BPM | WEIGHT: 127 LBS | BODY MASS INDEX: 21.16 KG/M2 | HEIGHT: 65 IN | DIASTOLIC BLOOD PRESSURE: 76 MMHG

## 2024-05-31 DIAGNOSIS — G54.9 SENSORY GANGLIONOPATHY: Primary | ICD-10-CM

## 2024-05-31 PROCEDURE — 99205 OFFICE O/P NEW HI 60 MIN: CPT | Performed by: PSYCHIATRY & NEUROLOGY

## 2024-05-31 NOTE — PATIENT INSTRUCTIONS
As we discussed, your sensory exam has worsened compared to Dr. Huber's exam in September 2023.  This suggests progression of the sensory neuropathy or neuronopathy seen on the EMG in June 2023.    I recommend repeating the EMG and I will anticipate seeing you back shortly for this study.    I will reorder the chest CT and my office will work on getting this approved by your insurance as it is clearly warranted.    I am repeating the paraneoplastic panel as there was one antibody that was slightly positive, of uncertain significance.    I'm glad to hear that you are entering rehab shortly.  We discussed that in addition to its other damaging effects on the body, alcohol may be potentially directly toxic to peripheral nerves and can be associated with nutritional deficiencies that can affect your nerves.    We discussed the importance of quitting smoking.    We discussed that several of your medications (gabapentin, nortriptyline, and venlafaxine) are potentially effective against neuropathic pain.  I would consider increasing one occasion at a time, such as increasing nortriptyline to 50 mg nightly.  However, we discussed waiting until after you have completed the upcoming rehab program, as these medications do not mix well with significant quantities of alcohol.  Contact my office when rehab is completed and we can discuss a dosage increase.    I will anticipate seeing you back for the EMG.  We will decide on timing of your next office visit after it is completed.

## 2024-05-31 NOTE — PROGRESS NOTES
Subjective     Maru Huynh is a 48 y.o. year old right handed female presenting as a new patient neuromuscular subspecialty consultation for sensory neuropathy/neuronopathy; seen previously for EMG on 6/7/2023.    HPI    She has past medical history significant for alcoholism for which she is about to enter a rehab facility, smoking, depression and anxiety, tubal ligation, tonsillectomy.    She is evaluated in the office today as a new patient referred for neuromuscular subspecialty consultation by my colleague Dr. Elvin Huber, neurology.    I have seen her once previously, on 6/7/2023, referred for EMG and nerve conduction studies which evaluated the right limbs.    She indicates onset in February 2023, out of the blue, of sensory disturbance.  She first noted this in both hands on awakening one morning.  It felt like the hands had fallen asleep but did not recover, and instead noted progression to similar numbness and tingling paresthesia in the distal lower extremities within the next several days.    Over time her stocking glove sensory disturbance a send did subjectively to knees and elbows, and seems to be at a plateau at this point.    She endorses intermittent pain which has a stabbing character and can involve feet, legs and/or distal upper extremities.    She endorses subjective weakness in the limbs that seems to be independent of the sensory disturbance.  She used to be able to split wood and now can barely  a log with both hands.  She is trying to do exercise to strengthen her lower extremities.    She does not use assistive devices for ambulation but indicates that she is off balance.  She is no longer able to run.  She needs to hold onto a railing to climb stairs.    She endorses intermittent neck pain which is bilateral and symmetric.  Low back pain when present is less prominent.    She denies bladder or bowel dysfunction.    Her electrodiagnostic testing on 6/7/2023, evaluating the right  limbs, showed normal upper and lower extremity motor studies with the exception of slight slowing of lower extremity conduction velocities.  Sensory responses were abnormal including absent median, markedly low amplitude ulnar with normal peak latency, moderately reduced amplitude radial with normal peak latency, and borderline sural and superficial peroneal sensory amplitudes with normal peak latencies.  Needle EMG was normal with the exception of inability to activate the EHL sufficiently for motor unit analysis, in the context of pain.    Given electrodiagnostic findings concerning for the possibility of sensory neuronopathy she underwent further testing including QUANG/KELLY panel which was positive at 1: 160 with negative panel, SPEP with immunofixation which was negative for monoclonal protein, vitamin B6 level which was normal at 55, rheumatoid factor which was normal at 10, and paraneoplastic panel which was positive only for voltage-gated potassium channel antibodies at a relatively low level of 0.30 nmol/L.    She had previously undergone HIV testing which was negative in April 2023.      B12 level has been consistently normal, most recently 953.  She indicates she is taking supplements of thiamine, B12 and folic acid.  She indicates that her diet has been suboptimal and she sometimes skips eating altogether for a day or two.     She is on a number of neuropathic pain agents including gabapentin currently 200 mg 3 times daily, nortriptyline 25 mg nightly, and venlafaxine recently titrated to 75 mg of the extended release formulation a matter of weeks ago.  Venlafaxine was apparently prescribed primarily for psychiatric rather than pain indications.    She continues to drink heavily but plans to enter a rehab center ([facility]) for alcohol on 6/10.  She indicates daily consumption can be as much as half a bottle of vodka and 2 pints of fireball.  Despite being on pain medications she indicates that one of the factors  "compelling her to drink is her ongoing pain.  That said, she also indicates that she has noted an interaction between alcohol and her pain medications, with the combination making her feel somewhat \"loopy\".    Chest x-ray done on 9/6/2023 was reported as an unremarkable study.  Chest CT has been ordered both by her neurologist and by her PCP but she indicates it has been denied by insurance.  She continues to smoke.    Review of Systems    Review of Systems:  Neurologic:  As per the history of present illness.  Constitutional: Positive for subjective fevers and chills, positive for fatigue and reduced appetite.  However, she indicates possibly some recent weight gain after significant loss.  Musculoskeletal: Positive for neck pain cardiovascular:  Negative for chest pain or palpitations.  Respiratory:  Negative for dyspnea.  Eyes:  Negative for vision loss or diplopia.  ENT:  Negative for hearing loss or tinnitus.  GI:  Negative for bowel incontinence.  :  Negative for bladder incontinence.  Dermatologic:  Negative for rash.            Patient Active Problem List   Diagnosis    Tobacco use    Alcohol dependence (Multi)    Marijuana dependence (Multi)    Generalized abdominal pain    Numbness and tingling in both hands    Ataxia    Uterine fibroid    Weight loss, unintentional    Hepatic steatosis    Moderate protein-calorie malnutrition (Multi)    Vomiting    Alcohol withdrawal syndrome (Multi)    Alcoholic ketoacidosis    Allergy    Alcohol-induced polyneuropathy (Multi)    Amenorrhea    Depression    Paresthesias    Seizure (Multi)    Sensory ganglionopathy    Well woman exam    Gastroenteritis    Marijuana use    Menopausal symptoms    Pain    Alcohol abuse    Tobacco use disorder    Fibroid    Nausea and vomiting    Voltage-gated potassium channel (VGKC) antibody positive    Paraneoplastic neurologic disorder (Multi)     No past medical history on file.  Past Surgical History:   Procedure Laterality Date    " TONSILLECTOMY Bilateral     TUBAL LIGATION       Social History     Tobacco Use    Smoking status: Every Day     Current packs/day: 1.00     Types: Cigarettes     Passive exposure: Never    Smokeless tobacco: Never   Substance Use Topics    Alcohol use: Yes     Alcohol/week: 3.0 standard drinks of alcohol     Types: 3 Shots of liquor per week     family history includes Breast cancer in her maternal grandmother; Colon cancer in her father; Liver cancer in her father; cardiac disorder in her mother and paternal grandfather.    Current Outpatient Medications:     amLODIPine (Norvasc) 10 mg tablet, TAKE 1 TABLET BY MOUTH DAILY, Disp: 90 tablet, Rfl: 2    blood pressure monitor kit, 1 each once daily., Disp: 1 kit, Rfl: 0    cloNIDine (Catapres) 0.1 mg tablet, TAKE 1 TABLET(0.1 MG) BY MOUTH TWICE DAILY, Disp: 180 tablet, Rfl: 1    cyanocobalamin (Vitamin B-12) 1,000 mcg tablet, TAKE 1 TABLET BY MOUTH EVERY DAY, Disp: 30 tablet, Rfl: 3    folic acid (Folvite) 1 mg tablet, TAKE 1 TABLET(1 MG) BY MOUTH EVERY DAY, Disp: 30 tablet, Rfl: 11    gabapentin (Neurontin) 100 mg capsule, TAKE 2 CAPSULES(200 MG) BY MOUTH THREE TIMES DAILY, Disp: 180 capsule, Rfl: 1    nortriptyline (Pamelor) 25 mg capsule, Take 1 capsule (25 mg) by mouth once daily at bedtime., Disp: 30 capsule, Rfl: 11    ondansetron ODT (Zofran-ODT) 8 mg disintegrating tablet, Take 1 tablet (8 mg) by mouth every 8 hours if needed for vomiting., Disp: 20 tablet, Rfl: 1    tiotropium (Spiriva) 18 mcg inhalation capsule, Place 1 capsule (18 mcg) into inhaler and inhale once daily., Disp: 30 capsule, Rfl: 5    venlafaxine XR (Effexor-XR) 37.5 mg 24 hr capsule, Take 1 capsule (37.5 mg) by mouth once daily. Do not crush or chew., Disp: 7 capsule, Rfl: 0    venlafaxine XR (Effexor-XR) 75 mg 24 hr capsule, Take 1 capsule (75 mg) by mouth once daily. Do not crush or chew., Disp: 30 capsule, Rfl: 11    Vitamin B-1 100 mg tablet, TAKE 1 TABLET BY MOUTH EVERY DAY, Disp: 30  tablet, Rfl: 11    Current Facility-Administered Medications:     pyridoxine (Vitamin B-6) tablet 25 mg, 25 mg, oral, Once, Javier Roberts MD  Allergies   Allergen Reactions    Penicillins Hives and Rash       Objective   Neurological Exam  Physical Exam    Physical Examination:    General: Alert, thin woman who was ambulatory without assistive devices.  Edentulous.    Cardiovascular: Warm hands and feet without discoloration or edema.    Mental Status: Clear sensorium without fluctuation.  Appropriate and oriented in conversation.  Recent and remote recall intact for details of medical history.  Attention and concentration intact during interview.  Fund of knowledge fair for medical information.  Language intact and fluent without paraphasic errors.    Cranial Nerves:  Funduscopic exam was not well visualized bilaterally on nondilated exam.  Pupils were equal, round and reactive to light with no relative afferent pupillary defect.  Extraocular movements were intact and conjugate without nystagmus.  No ptosis.  Visual fields were full to confrontation tested binocularly.  Facial sensation was asymmetric to pin over V1 and V2, slightly sharper on the left.  Facial motor function was symmetrically intact.  Hearing was grossly intact.  No dysarthria.  Shoulder shrug was symmetric.  Tongue protrusion was midline.    Motor: Muscle bulk was generally slender without obvious focal atrophy.  Tone was normal throughout.  Confrontation strength was symmetrically 5/5 throughout with the exception of 4-4+ bilateral triceps and ADM.    Coordination: Finger to finger, heel to shin, and alternating hand movements were rapid and accurate without dysmetria. There was no tremor, myoclonus or pseudoathetosis.     Tendon Reflexes: Symmetrically absent biceps, brachioradialis and triceps, and absent patellar and ankles even with reinforcement.  Neutral plantars.    Sensation: Pin was dull over the right median digits, sharp over  the right ulnar digits and the left volar fingertips.  There was a symmetric stocking gradient to pin which was dull all the way up to mid thigh bilaterally.  Vibration thresholds were normal at the index fingers.  Vibration perception was absent at the right great toe, present at the left great toe and strong bilaterally at the medial malleolus.  Romberg sign was absent.    Station: Intact and stable.    Gait: Stable and unremarkable.  Marked difficulty with tandem.      Assessment/Plan     She continues to exhibit stocking glove sensory deficits and her exam has progressed somewhat compared to the reported exam from September 2023.  I reviewed that her EMG done roughly 1 year ago was compatible with sensory neuropathy or neuronopathy.  Thus far there has not been definitive evidence of an etiology such as connective tissue disorder or paraneoplasia.  I did discuss the possibility of her heavy alcohol consumption, which is ongoing although she plans to enter rehab soon, as a contributing factor.    I recommended repeating the EMG as her exam has progressed.  I will anticipate seeing her back shortly for the study.    I am repeating the paraneoplastic panel.  The significance of the previously noted low positive voltage-gated potassium channel antibody is unclear as this is not a classic association with sensory neuronopathy.    Also pertinent to the question of a paraneoplastic process I am reordering the chest CT, which I believe is clearly warranted given her weight loss, ongoing smoking and a neurological syndrome that could very possibly be paraneoplastic.  This was previously denied by insurance but my office will appeal if it is again met with the denial.    I advised her to continue nutritional supplements including thiamine, B12 and folic acid.    I encouraged her to move forward with the planned alcohol rehab program.    I discussed with her the importance of quitting smoking as well.    For management of  her neuropathic pain I discussed that there are several options as she is already on gabapentin, nortriptyline and venlafaxine, albeit all of them at relatively low doses.  I would consider titrating nortriptyline to 50 mg nightly but advised that we make this decision after she has completed her upcoming alcohol rehab, as she has already noted that these medications do not mix well with alcohol.  She is to contact me at the appropriate time for recommendations on medication titration.    We will determine timing of her next office visit after the EMG is completed.

## 2024-06-04 ENCOUNTER — LAB (OUTPATIENT)
Dept: LAB | Facility: LAB | Age: 49
End: 2024-06-04
Payer: COMMERCIAL

## 2024-06-04 DIAGNOSIS — R56.9 SEIZURE (MULTI): ICD-10-CM

## 2024-06-04 DIAGNOSIS — R20.0 NUMBNESS AND TINGLING IN BOTH HANDS: ICD-10-CM

## 2024-06-04 DIAGNOSIS — R20.2 NUMBNESS AND TINGLING IN BOTH HANDS: ICD-10-CM

## 2024-06-04 DIAGNOSIS — F12.90 MARIJUANA USE: ICD-10-CM

## 2024-06-04 DIAGNOSIS — C80.1 PARANEOPLASTIC NEUROLOGIC DISORDER (MULTI): ICD-10-CM

## 2024-06-04 DIAGNOSIS — G62.1 ALCOHOL-INDUCED POLYNEUROPATHY (MULTI): ICD-10-CM

## 2024-06-04 DIAGNOSIS — F12.20 MARIJUANA DEPENDENCE (MULTI): ICD-10-CM

## 2024-06-04 DIAGNOSIS — F17.200 TOBACCO USE DISORDER: ICD-10-CM

## 2024-06-04 DIAGNOSIS — F10.220 ALCOHOL DEPENDENCE WITH UNCOMPLICATED INTOXICATION (MULTI): ICD-10-CM

## 2024-06-04 DIAGNOSIS — G98.8 PARANEOPLASTIC NEUROLOGIC DISORDER (MULTI): ICD-10-CM

## 2024-06-04 DIAGNOSIS — M79.2 PERIPHERAL NEUROPATHIC PAIN: ICD-10-CM

## 2024-06-04 DIAGNOSIS — R53.83 TIREDNESS: ICD-10-CM

## 2024-06-04 DIAGNOSIS — I10 PRIMARY HYPERTENSION: ICD-10-CM

## 2024-06-04 DIAGNOSIS — R11.2 NAUSEA AND VOMITING, UNSPECIFIED VOMITING TYPE: ICD-10-CM

## 2024-06-04 DIAGNOSIS — G54.9 SENSORY GANGLIONOPATHY: ICD-10-CM

## 2024-06-04 LAB
ALBUMIN SERPL BCP-MCNC: 4.2 G/DL (ref 3.4–5)
ALP SERPL-CCNC: 175 U/L (ref 33–110)
ALT SERPL W P-5'-P-CCNC: 30 U/L (ref 7–45)
ANION GAP SERPL CALC-SCNC: 12 MMOL/L (ref 10–20)
AST SERPL W P-5'-P-CCNC: 69 U/L (ref 9–39)
BASOPHILS # BLD AUTO: 0.06 X10*3/UL (ref 0–0.1)
BASOPHILS NFR BLD AUTO: 0.7 %
BILIRUB SERPL-MCNC: 0.6 MG/DL (ref 0–1.2)
BUN SERPL-MCNC: 5 MG/DL (ref 6–23)
CALCIUM SERPL-MCNC: 9.7 MG/DL (ref 8.6–10.3)
CHLORIDE SERPL-SCNC: 99 MMOL/L (ref 98–107)
CHOLEST SERPL-MCNC: 178 MG/DL (ref 0–199)
CHOLESTEROL/HDL RATIO: 4
CO2 SERPL-SCNC: 33 MMOL/L (ref 21–32)
CREAT SERPL-MCNC: 0.55 MG/DL (ref 0.5–1.05)
CREAT UR-MCNC: 164.8 MG/DL (ref 20–320)
EGFRCR SERPLBLD CKD-EPI 2021: >90 ML/MIN/1.73M*2
EOSINOPHIL # BLD AUTO: 0.11 X10*3/UL (ref 0–0.7)
EOSINOPHIL NFR BLD AUTO: 1.2 %
ERYTHROCYTE [DISTWIDTH] IN BLOOD BY AUTOMATED COUNT: 13.3 % (ref 11.5–14.5)
GLUCOSE SERPL-MCNC: 93 MG/DL (ref 74–99)
HCT VFR BLD AUTO: 48.3 % (ref 36–46)
HDLC SERPL-MCNC: 44.9 MG/DL
HGB BLD-MCNC: 15.9 G/DL (ref 12–16)
IMM GRANULOCYTES # BLD AUTO: 0.03 X10*3/UL (ref 0–0.7)
IMM GRANULOCYTES NFR BLD AUTO: 0.3 % (ref 0–0.9)
LDLC SERPL CALC-MCNC: 91 MG/DL
LYMPHOCYTES # BLD AUTO: 3.83 X10*3/UL (ref 1.2–4.8)
LYMPHOCYTES NFR BLD AUTO: 42.8 %
MCH RBC QN AUTO: 33.8 PG (ref 26–34)
MCHC RBC AUTO-ENTMCNC: 32.9 G/DL (ref 32–36)
MCV RBC AUTO: 103 FL (ref 80–100)
MICROALBUMIN UR-MCNC: 13.6 MG/L
MICROALBUMIN/CREAT UR: 8.3 UG/MG CREAT
MONOCYTES # BLD AUTO: 0.49 X10*3/UL (ref 0.1–1)
MONOCYTES NFR BLD AUTO: 5.5 %
NEUTROPHILS # BLD AUTO: 4.42 X10*3/UL (ref 1.2–7.7)
NEUTROPHILS NFR BLD AUTO: 49.5 %
NON HDL CHOLESTEROL: 133 MG/DL (ref 0–149)
NRBC BLD-RTO: 0.2 /100 WBCS (ref 0–0)
PLATELET # BLD AUTO: 217 X10*3/UL (ref 150–450)
POTASSIUM SERPL-SCNC: 4.6 MMOL/L (ref 3.5–5.3)
PROT SERPL-MCNC: 7.2 G/DL (ref 6.4–8.2)
RBC # BLD AUTO: 4.7 X10*6/UL (ref 4–5.2)
SODIUM SERPL-SCNC: 139 MMOL/L (ref 136–145)
TRIGL SERPL-MCNC: 210 MG/DL (ref 0–149)
TSH SERPL-ACNC: 3.7 MIU/L (ref 0.44–3.98)
VLDL: 42 MG/DL (ref 0–40)
WBC # BLD AUTO: 8.9 X10*3/UL (ref 4.4–11.3)

## 2024-06-04 PROCEDURE — 80061 LIPID PANEL: CPT

## 2024-06-04 PROCEDURE — 86255 FLUORESCENT ANTIBODY SCREEN: CPT

## 2024-06-04 PROCEDURE — 83519 RIA NONANTIBODY: CPT

## 2024-06-04 PROCEDURE — 82043 UR ALBUMIN QUANTITATIVE: CPT

## 2024-06-04 PROCEDURE — 82570 ASSAY OF URINE CREATININE: CPT

## 2024-06-04 PROCEDURE — 84443 ASSAY THYROID STIM HORMONE: CPT

## 2024-06-04 PROCEDURE — 85025 COMPLETE CBC W/AUTO DIFF WBC: CPT

## 2024-06-04 PROCEDURE — 36415 COLL VENOUS BLD VENIPUNCTURE: CPT

## 2024-06-04 PROCEDURE — 80053 COMPREHEN METABOLIC PANEL: CPT

## 2024-06-07 ENCOUNTER — OFFICE VISIT (OUTPATIENT)
Dept: PRIMARY CARE | Facility: CLINIC | Age: 49
End: 2024-06-07
Payer: COMMERCIAL

## 2024-06-07 VITALS
OXYGEN SATURATION: 100 % | SYSTOLIC BLOOD PRESSURE: 102 MMHG | WEIGHT: 127 LBS | RESPIRATION RATE: 16 BRPM | HEART RATE: 100 BPM | TEMPERATURE: 97.7 F | DIASTOLIC BLOOD PRESSURE: 64 MMHG | HEIGHT: 65 IN | BODY MASS INDEX: 21.16 KG/M2

## 2024-06-07 DIAGNOSIS — E78.1 HYPERTRIGLYCERIDEMIA: ICD-10-CM

## 2024-06-07 DIAGNOSIS — I10 PRIMARY HYPERTENSION: ICD-10-CM

## 2024-06-07 DIAGNOSIS — R20.2 NUMBNESS AND TINGLING IN BOTH HANDS: ICD-10-CM

## 2024-06-07 DIAGNOSIS — R27.0 ATAXIA: ICD-10-CM

## 2024-06-07 DIAGNOSIS — F17.200 TOBACCO USE DISORDER: ICD-10-CM

## 2024-06-07 DIAGNOSIS — N95.1 MENOPAUSAL SYMPTOMS: ICD-10-CM

## 2024-06-07 DIAGNOSIS — R20.0 NUMBNESS AND TINGLING IN BOTH HANDS: ICD-10-CM

## 2024-06-07 DIAGNOSIS — F10.229 ALCOHOL DEPENDENCE WITH INTOXICATION WITH COMPLICATION (MULTI): ICD-10-CM

## 2024-06-07 DIAGNOSIS — K76.0 HEPATIC STEATOSIS: ICD-10-CM

## 2024-06-07 DIAGNOSIS — T78.40XD ALLERGY, SUBSEQUENT ENCOUNTER: ICD-10-CM

## 2024-06-07 DIAGNOSIS — F10.220 ALCOHOL DEPENDENCE WITH UNCOMPLICATED INTOXICATION (MULTI): ICD-10-CM

## 2024-06-07 DIAGNOSIS — R20.2 PARESTHESIAS: Primary | ICD-10-CM

## 2024-06-07 PROBLEM — Z20.822 CONTACT WITH AND (SUSPECTED) EXPOSURE TO COVID-19: Status: RESOLVED | Noted: 2023-04-01 | Resolved: 2024-06-07

## 2024-06-07 PROBLEM — E87.29 OTHER ACIDOSIS: Status: RESOLVED | Noted: 2023-04-01 | Resolved: 2024-06-07

## 2024-06-07 PROCEDURE — 3078F DIAST BP <80 MM HG: CPT | Performed by: INTERNAL MEDICINE

## 2024-06-07 PROCEDURE — 4004F PT TOBACCO SCREEN RCVD TLK: CPT | Performed by: INTERNAL MEDICINE

## 2024-06-07 PROCEDURE — 99214 OFFICE O/P EST MOD 30 MIN: CPT | Performed by: INTERNAL MEDICINE

## 2024-06-07 PROCEDURE — 3074F SYST BP LT 130 MM HG: CPT | Performed by: INTERNAL MEDICINE

## 2024-06-07 RX ORDER — VENLAFAXINE HYDROCHLORIDE 75 MG/1
75 CAPSULE, EXTENDED RELEASE ORAL DAILY
Qty: 90 CAPSULE | Refills: 1 | Status: SHIPPED | OUTPATIENT
Start: 2024-06-07 | End: 2025-06-07

## 2024-06-07 RX ORDER — NORTRIPTYLINE HYDROCHLORIDE 50 MG/1
50 CAPSULE ORAL NIGHTLY
Qty: 90 CAPSULE | Refills: 1 | Status: SHIPPED | OUTPATIENT
Start: 2024-06-07 | End: 2025-06-07

## 2024-06-07 RX ORDER — VENLAFAXINE HYDROCHLORIDE 37.5 MG/1
37.5 CAPSULE, EXTENDED RELEASE ORAL DAILY
Qty: 90 CAPSULE | Refills: 1 | Status: SHIPPED | OUTPATIENT
Start: 2024-06-07 | End: 2025-06-07

## 2024-06-07 RX ORDER — AMLODIPINE BESYLATE 10 MG/1
TABLET ORAL
Qty: 90 TABLET | Refills: 2 | Status: SHIPPED | OUTPATIENT
Start: 2024-06-07

## 2024-06-07 RX ORDER — CLONIDINE HYDROCHLORIDE 0.1 MG/1
TABLET ORAL
Qty: 180 TABLET | Refills: 1 | Status: SHIPPED | OUTPATIENT
Start: 2024-06-07

## 2024-06-07 RX ORDER — LANOLIN ALCOHOL/MO/W.PET/CERES
100 CREAM (GRAM) TOPICAL DAILY
Qty: 90 TABLET | Refills: 1 | Status: SHIPPED | OUTPATIENT
Start: 2024-06-07 | End: 2025-06-07

## 2024-06-07 RX ORDER — TIOTROPIUM BROMIDE 18 UG/1
1 CAPSULE ORAL; RESPIRATORY (INHALATION)
Qty: 90 CAPSULE | Refills: 1 | Status: SHIPPED | OUTPATIENT
Start: 2024-06-07 | End: 2024-12-04

## 2024-06-07 RX ORDER — FOLIC ACID 1 MG/1
1 TABLET ORAL DAILY
Qty: 90 TABLET | Refills: 1 | Status: SHIPPED | OUTPATIENT
Start: 2024-06-07 | End: 2025-06-07

## 2024-06-07 RX ORDER — MELOXICAM 7.5 MG/1
7.5 TABLET ORAL DAILY
Qty: 90 TABLET | Refills: 1 | Status: SHIPPED | OUTPATIENT
Start: 2024-06-07 | End: 2025-06-07

## 2024-06-07 RX ORDER — LANOLIN ALCOHOL/MO/W.PET/CERES
1000 CREAM (GRAM) TOPICAL DAILY
Qty: 90 TABLET | Refills: 1 | Status: SHIPPED | OUTPATIENT
Start: 2024-06-07 | End: 2025-06-07

## 2024-06-07 SDOH — ECONOMIC STABILITY: FOOD INSECURITY: WITHIN THE PAST 12 MONTHS, YOU WORRIED THAT YOUR FOOD WOULD RUN OUT BEFORE YOU GOT MONEY TO BUY MORE.: NEVER TRUE

## 2024-06-07 SDOH — ECONOMIC STABILITY: FOOD INSECURITY: WITHIN THE PAST 12 MONTHS, THE FOOD YOU BOUGHT JUST DIDN'T LAST AND YOU DIDN'T HAVE MONEY TO GET MORE.: NEVER TRUE

## 2024-06-07 ASSESSMENT — PATIENT HEALTH QUESTIONNAIRE - PHQ9
1. LITTLE INTEREST OR PLEASURE IN DOING THINGS: SEVERAL DAYS
2. FEELING DOWN, DEPRESSED OR HOPELESS: SEVERAL DAYS
SUM OF ALL RESPONSES TO PHQ9 QUESTIONS 1 & 2: 2
10. IF YOU CHECKED OFF ANY PROBLEMS, HOW DIFFICULT HAVE THESE PROBLEMS MADE IT FOR YOU TO DO YOUR WORK, TAKE CARE OF THINGS AT HOME, OR GET ALONG WITH OTHER PEOPLE: SOMEWHAT DIFFICULT

## 2024-06-07 ASSESSMENT — ENCOUNTER SYMPTOMS
OCCASIONAL FEELINGS OF UNSTEADINESS: 1
DEPRESSION: 0
LOSS OF SENSATION IN FEET: 0

## 2024-06-07 ASSESSMENT — PAIN SCALES - GENERAL: PAINLEVEL: 6

## 2024-06-07 NOTE — PROGRESS NOTES
Chief Complaint/HPI:    Follow-up visit.     Alcohol-induced Polyneuropathy:     She is currently on Gabapentin 200 mg TID. Patient had seen Dr Da Silva for evaluation in 5/2024, she will be admitted to alcohol rehab next Monday, she will NOT be able to use gabapentin once she enters rehab. She will also not be able to use marijuana during rehab.  Patient wonders about other options , patient wonders about use of meloxicam, Dr Da Silva did advise the patient that Nortriptyline and venlafaxine may be helpful for neuropathy       Uterine Fibroids: Patient has seen Dr Thibodeaux, he will be retiring soon per patient, she will be seeing a new provider next year      Depression/Anxiety: She is no longer on Cymbalta due to alcohol-use. She currently takes venlafaxine and nortiptyline, patient wonders if increasing these meds would be helpful for neuropathy pain     HTN: Currently on amlodipine 10 mg and clonidine 0.1 mg     Patient gets hot flashes, she has difficulty getting to sleep      Smoker:  patient has smoked for at least 20 years. She is tired, she does sleep at night, she also smokes marijuana , she will be required to stop marijuana during rehab stay    ROS otherwise negative aside from what was mentioned above in HPI.      Patient Active Problem List   Diagnosis    Tobacco use    Alcohol dependence (Multi)    Marijuana dependence (Multi)    Generalized abdominal pain    Numbness and tingling in both hands    Ataxia    Uterine fibroid    Weight loss, unintentional    Hepatic steatosis    Moderate protein-calorie malnutrition (Multi)    Vomiting    Alcohol withdrawal syndrome (Multi)    Alcoholic ketoacidosis    Allergy    Alcohol-induced polyneuropathy (Multi)    Amenorrhea    Depression    Paresthesias    Seizure (Multi)    Sensory ganglionopathy    Well woman exam    Gastroenteritis    Marijuana use    Menopausal symptoms    Pain    Alcohol abuse    Tobacco use disorder    Fibroid    Nausea and vomiting     Voltage-gated potassium channel (VGKC) antibody positive    Paraneoplastic neurologic disorder (Multi)    Essential (primary) hypertension         Past Medical History:   Diagnosis Date    Contact with and (suspected) exposure to covid-19 04/01/2023    Other acidosis 04/01/2023     Past Surgical History:   Procedure Laterality Date    TONSILLECTOMY Bilateral     TUBAL LIGATION       Social History     Social History Narrative    Not on file         ALLERGIES  Penicillins      MEDICATIONS  Current Outpatient Medications on File Prior to Visit   Medication Sig Dispense Refill    blood pressure monitor kit 1 each once daily. 1 kit 0    gabapentin (Neurontin) 100 mg capsule TAKE 2 CAPSULES(200 MG) BY MOUTH THREE TIMES DAILY 180 capsule 1    ondansetron ODT (Zofran-ODT) 8 mg disintegrating tablet Take 1 tablet (8 mg) by mouth every 8 hours if needed for vomiting. 20 tablet 1    [DISCONTINUED] amLODIPine (Norvasc) 10 mg tablet TAKE 1 TABLET BY MOUTH DAILY 90 tablet 2    [DISCONTINUED] cloNIDine (Catapres) 0.1 mg tablet TAKE 1 TABLET(0.1 MG) BY MOUTH TWICE DAILY 180 tablet 1    [DISCONTINUED] cyanocobalamin (Vitamin B-12) 1,000 mcg tablet TAKE 1 TABLET BY MOUTH EVERY DAY 30 tablet 3    [DISCONTINUED] folic acid (Folvite) 1 mg tablet TAKE 1 TABLET(1 MG) BY MOUTH EVERY DAY 30 tablet 11    [DISCONTINUED] nortriptyline (Pamelor) 25 mg capsule Take 1 capsule (25 mg) by mouth once daily at bedtime. 30 capsule 11    [DISCONTINUED] tiotropium (Spiriva) 18 mcg inhalation capsule Place 1 capsule (18 mcg) into inhaler and inhale once daily. 30 capsule 5    [DISCONTINUED] venlafaxine XR (Effexor-XR) 75 mg 24 hr capsule Take 1 capsule (75 mg) by mouth once daily. Do not crush or chew. 30 capsule 11    [DISCONTINUED] Vitamin B-1 100 mg tablet TAKE 1 TABLET BY MOUTH EVERY DAY 30 tablet 11    [DISCONTINUED] venlafaxine XR (Effexor-XR) 37.5 mg 24 hr capsule Take 1 capsule (37.5 mg) by mouth once daily. Do not crush or chew. 7 capsule 0  "    Current Facility-Administered Medications on File Prior to Visit   Medication Dose Route Frequency Provider Last Rate Last Admin    pyridoxine (Vitamin B-6) tablet 25 mg  25 mg oral Once Javier Roberts MD             PHYSICAL EXAM  /64 (BP Location: Left arm, Patient Position: Sitting, BP Cuff Size: Adult)   Pulse 100   Temp 36.5 °C (97.7 °F)   Resp 16   Ht 1.651 m (5' 5\")   Wt 57.6 kg (127 lb)   SpO2 100%   BMI 21.13 kg/m²   Body mass index is 21.13 kg/m².  CONSTITUTIONAL - well nourished, well developed, looks like stated age, in no acute distress, not ill-appearing, she is edentulous  SKIN - normal skin color and pigmentation, normal skin turgor without rash, lesions, or nodules visualized on exposed skin  HEAD - no trauma, normocephalic, patient is edentulous   EYES - pupils are equal and reactive to light, extraocular muscles are intact, and normal external exam  CHEST - decreased lung sounds to upper lung fields, no wheezing, no crackles and no rales, good effort  CARDIAC - regular rate and regular rhythm, no skipped beats, no murmur  EXTREMITIES - no edema, no deformities  NEUROLOGICAL -stocking glove sensory loss, the sensory loss is distal to med thighs bilaterally   PSYCHIATRIC - alert, pleasant and cordial, age-appropriate  IMMUNOLOGIC - no cervical lymphadenopathy   ABDOMEN -  distended, not tender.      ASSESSMENT/PLAN  Problem List Items Addressed This Visit       Alcohol dependence (Multi)    Current Assessment & Plan     Patient will be in rehab facility for at least 90 days, check labs in a few months. Follow up after stay is completed         Relevant Medications    nortriptyline (Pamelor) 50 mg capsule    thiamine (Vitamin B-1) 100 mg tablet    folic acid (Folvite) 1 mg tablet    cyanocobalamin (Vitamin B-12) 1,000 mcg tablet    Other Relevant Orders    CBC and Auto Differential    Comprehensive Metabolic Panel    Iron and TIBC    CBC and Auto Differential    Comprehensive " Metabolic Panel    Numbness and tingling in both hands    Relevant Medications    nortriptyline (Pamelor) 50 mg capsule    meloxicam (Mobic) 7.5 mg tablet    Other Relevant Orders    CBC and Auto Differential    Comprehensive Metabolic Panel    Vitamin B12    Ataxia    Relevant Medications    thiamine (Vitamin B-1) 100 mg tablet    folic acid (Folvite) 1 mg tablet    cyanocobalamin (Vitamin B-12) 1,000 mcg tablet    Other Relevant Orders    CBC and Auto Differential    Comprehensive Metabolic Panel    TSH with reflex to Free T4 if abnormal    Hepatic steatosis    Relevant Medications    thiamine (Vitamin B-1) 100 mg tablet    folic acid (Folvite) 1 mg tablet    cyanocobalamin (Vitamin B-12) 1,000 mcg tablet    Other Relevant Orders    CBC and Auto Differential    Comprehensive Metabolic Panel    Lipid Panel    Allergy    Relevant Medications    tiotropium (Spiriva) 18 mcg inhalation capsule    Other Relevant Orders    CBC and Auto Differential    Comprehensive Metabolic Panel    Paresthesias - Primary    Relevant Medications    meloxicam (Mobic) 7.5 mg tablet    Other Relevant Orders    CBC and Auto Differential    Comprehensive Metabolic Panel    Vitamin D 25-Hydroxy,Total (for eval of Vitamin D levels)    TSH with reflex to Free T4 if abnormal    Vitamin B12    Menopausal symptoms    Relevant Medications    venlafaxine XR (Effexor-XR) 37.5 mg 24 hr capsule    venlafaxine XR (Effexor-XR) 75 mg 24 hr capsule    Other Relevant Orders    CBC and Auto Differential    Comprehensive Metabolic Panel    Vitamin D 25-Hydroxy,Total (for eval of Vitamin D levels)    Tobacco use disorder    Relevant Medications    tiotropium (Spiriva) 18 mcg inhalation capsule    Other Relevant Orders    CBC and Auto Differential    Comprehensive Metabolic Panel     Other Visit Diagnoses       Primary hypertension        Relevant Medications    amLODIPine (Norvasc) 10 mg tablet    cloNIDine (Catapres) 0.1 mg tablet    Other Relevant Orders     CBC and Auto Differential    Comprehensive Metabolic Panel    Albumin , Urine Random    Hypertriglyceridemia        Relevant Orders    Lipid Panel          Labs ordered, increase the nortriptyline, increase the venlafaxine, will add meloxicam to use to treat neuropathy since the patient will require cessation of alcohol during her rehab stay. She will be in rehab facility for at least 3 months    Follow up after stay    Javier Roberts MD

## 2024-06-07 NOTE — ASSESSMENT & PLAN NOTE
Patient will be in rehab facility for at least 90 days, check labs in a few months. Follow up after stay is completed

## 2024-06-11 LAB
CASPR2-IGG CBA, S(MAYO): NEGATIVE
LGI1-IGG CBA, S(MAYO): NEGATIVE

## 2024-06-14 LAB
AMPHIPHYSIN IGG SER QL IA: NEGATIVE
ANNOTATION COMMENT IMP: ABNORMAL
CV2 AB SERPL QL IF: NEGATIVE
GLIAL NUC TYPE 1 AB SER QL IF: NEGATIVE
HU1 AB SER QL: NEGATIVE
HU2 AB SER QL IF: NEGATIVE
HU3 AB SER QL: NEGATIVE
PARANEOPLASTIC AB SER-IMP: ABNORMAL
PCA-1 AB SER QL IF: NEGATIVE
PCA-2 AB SER QL IF: NEGATIVE
PCA-TR AB SER QL IF: NEGATIVE
VGCC-P/Q BIND IGG+IGM SER IA-SCNC: 0 NMOL/L
VGKC IGG+IGM SER IA-SCNC: 0.2 NMOL/L

## 2024-09-19 ENCOUNTER — TELEPHONE (OUTPATIENT)
Dept: PRIMARY CARE | Facility: CLINIC | Age: 49
End: 2024-09-19
Payer: COMMERCIAL

## 2024-09-19 DIAGNOSIS — Z86.19 HISTORY OF TRICHOMONAL VAGINITIS: Primary | ICD-10-CM

## 2024-09-19 NOTE — TELEPHONE ENCOUNTER
Patient phone because she saw Dr. NICHOLS in March of 2023. For alcohol abuse.  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   She wants to know if she has ever been tested for this.

## 2024-10-10 ENCOUNTER — APPOINTMENT (OUTPATIENT)
Dept: PRIMARY CARE | Facility: CLINIC | Age: 49
End: 2024-10-10
Payer: COMMERCIAL

## 2025-03-28 ENCOUNTER — TELEPHONE (OUTPATIENT)
Dept: PRIMARY CARE | Facility: CLINIC | Age: 50
End: 2025-03-28
Payer: COMMERCIAL

## 2025-03-28 NOTE — TELEPHONE ENCOUNTER
Patient called about medical records request. It was located and sent to [facility] today 3/28/25.     Call disconnected so please let her know it was sent if she calls back.

## 2025-04-21 ENCOUNTER — APPOINTMENT (OUTPATIENT)
Dept: OBSTETRICS AND GYNECOLOGY | Facility: CLINIC | Age: 50
End: 2025-04-21
Payer: COMMERCIAL